# Patient Record
Sex: MALE | Race: WHITE | NOT HISPANIC OR LATINO | Employment: OTHER | URBAN - METROPOLITAN AREA
[De-identification: names, ages, dates, MRNs, and addresses within clinical notes are randomized per-mention and may not be internally consistent; named-entity substitution may affect disease eponyms.]

---

## 2023-02-03 PROBLEM — R53.83 LOW ENERGY: Status: ACTIVE | Noted: 2023-02-03

## 2023-02-03 PROBLEM — J30.9 ALLERGIC RHINITIS: Status: ACTIVE | Noted: 2023-02-03

## 2023-02-03 PROBLEM — H52.4 HYPEROPIA WITH ASTIGMATISM AND PRESBYOPIA: Status: ACTIVE | Noted: 2023-02-03

## 2023-02-03 PROBLEM — M79.673 FOOT PAIN: Status: ACTIVE | Noted: 2023-02-03

## 2023-02-03 PROBLEM — E55.9 VITAMIN D DEFICIENCY: Status: ACTIVE | Noted: 2023-02-03

## 2023-02-03 PROBLEM — N52.9 MALE ERECTILE DISORDER: Status: ACTIVE | Noted: 2023-02-03

## 2023-02-03 PROBLEM — H52.209 HYPEROPIA WITH ASTIGMATISM AND PRESBYOPIA: Status: ACTIVE | Noted: 2023-02-03

## 2023-02-03 PROBLEM — M54.50 LOWER BACK PAIN: Status: ACTIVE | Noted: 2023-02-03

## 2023-02-03 PROBLEM — M72.2 PLANTAR FASCIITIS: Status: ACTIVE | Noted: 2023-02-03

## 2023-02-03 PROBLEM — L73.9 FOLLICULITIS: Status: ACTIVE | Noted: 2023-02-03

## 2023-02-03 PROBLEM — I10 HYPERTENSION: Status: ACTIVE | Noted: 2023-02-03

## 2023-02-03 PROBLEM — N40.0 BPH (BENIGN PROSTATIC HYPERPLASIA): Status: ACTIVE | Noted: 2023-02-03

## 2023-02-03 PROBLEM — H52.00 HYPEROPIA WITH ASTIGMATISM AND PRESBYOPIA: Status: ACTIVE | Noted: 2023-02-03

## 2023-02-03 PROBLEM — G57.60 MORTON NEUROMA: Status: ACTIVE | Noted: 2023-02-03

## 2023-02-03 PROBLEM — R25.1 TREMOR OF RIGHT HAND: Status: ACTIVE | Noted: 2023-02-03

## 2023-02-03 PROBLEM — M47.812 CERVICAL SPONDYLOSIS: Status: ACTIVE | Noted: 2023-02-03

## 2023-02-03 PROBLEM — R51.9 HEADACHE: Status: ACTIVE | Noted: 2023-02-03

## 2023-02-03 PROBLEM — G25.0 ESSENTIAL TREMOR: Status: ACTIVE | Noted: 2023-02-03

## 2023-02-03 PROBLEM — H02.889 MGD (MEIBOMIAN GLAND DISEASE): Status: ACTIVE | Noted: 2023-02-03

## 2023-02-03 PROBLEM — E78.5 HYPERLIPIDEMIA: Status: ACTIVE | Noted: 2023-02-03

## 2023-02-03 RX ORDER — ATORVASTATIN CALCIUM 20 MG/1
1 TABLET, FILM COATED ORAL NIGHTLY
COMMUNITY
Start: 2022-05-16 | End: 2023-05-10 | Stop reason: SDUPTHER

## 2023-02-03 RX ORDER — LISINOPRIL 20 MG/1
1 TABLET ORAL DAILY
COMMUNITY
Start: 2021-03-01 | End: 2023-10-23 | Stop reason: SDUPTHER

## 2023-02-03 RX ORDER — TADALAFIL 5 MG/1
1 TABLET ORAL DAILY
COMMUNITY
Start: 2021-09-16 | End: 2024-05-31 | Stop reason: SDUPTHER

## 2023-02-03 RX ORDER — MUPIROCIN 20 MG/G
OINTMENT TOPICAL
COMMUNITY
Start: 2022-06-25 | End: 2023-06-14 | Stop reason: SDUPTHER

## 2023-02-03 RX ORDER — AZELASTINE 1 MG/ML
2 SPRAY, METERED NASAL 2 TIMES DAILY
COMMUNITY
Start: 2018-04-12

## 2023-02-03 RX ORDER — TAMSULOSIN HYDROCHLORIDE 0.4 MG/1
1 CAPSULE ORAL 2 TIMES DAILY
COMMUNITY
Start: 2016-10-31 | End: 2024-05-28 | Stop reason: SDUPTHER

## 2023-02-03 RX ORDER — GABAPENTIN 600 MG/1
1 TABLET ORAL DAILY
COMMUNITY
Start: 2021-09-01 | End: 2023-03-31 | Stop reason: SDUPTHER

## 2023-02-03 RX ORDER — PROPRANOLOL HYDROCHLORIDE 60 MG/1
60 CAPSULE, EXTENDED RELEASE ORAL DAILY
COMMUNITY
End: 2023-06-14 | Stop reason: DRUGHIGH

## 2023-02-03 RX ORDER — FLUTICASONE PROPIONATE 50 MCG
2 SPRAY, SUSPENSION (ML) NASAL DAILY
COMMUNITY
Start: 2018-04-12 | End: 2023-04-19 | Stop reason: SDUPTHER

## 2023-02-03 RX ORDER — MELOXICAM 15 MG/1
1 TABLET ORAL DAILY
COMMUNITY
Start: 2020-01-06

## 2023-03-09 ENCOUNTER — TELEPHONE (OUTPATIENT)
Dept: PRIMARY CARE | Facility: CLINIC | Age: 63
End: 2023-03-09
Payer: MEDICARE

## 2023-03-09 NOTE — TELEPHONE ENCOUNTER
Oskar Newton. When I called this patient to reschedule for you vacation he asked if you can call when you get back. He wanted you to go over his results from his imaging done on his back. Thanks. Seamus

## 2023-03-17 ENCOUNTER — APPOINTMENT (OUTPATIENT)
Dept: PRIMARY CARE | Facility: CLINIC | Age: 63
End: 2023-03-17
Payer: MEDICARE

## 2023-03-31 DIAGNOSIS — G57.63 MORTON'S NEUROMA OF BOTH FEET: Primary | ICD-10-CM

## 2023-03-31 RX ORDER — GABAPENTIN 600 MG/1
600 TABLET ORAL DAILY PRN
Qty: 30 TABLET | Refills: 3 | Status: SHIPPED | OUTPATIENT
Start: 2023-03-31 | End: 2023-08-28 | Stop reason: SDUPTHER

## 2023-03-31 RX ORDER — CICLOPIROX OLAMINE 7.7 MG/G
CREAM TOPICAL 2 TIMES DAILY
COMMUNITY
End: 2023-03-31 | Stop reason: SDUPTHER

## 2023-03-31 RX ORDER — CICLOPIROX OLAMINE 7.7 MG/G
CREAM TOPICAL 2 TIMES DAILY
Qty: 28 G | Refills: 2 | Status: SHIPPED | OUTPATIENT
Start: 2023-03-31

## 2023-04-19 DIAGNOSIS — J30.9 ALLERGIC RHINITIS, UNSPECIFIED SEASONALITY, UNSPECIFIED TRIGGER: Primary | ICD-10-CM

## 2023-04-19 RX ORDER — FLUTICASONE PROPIONATE 50 MCG
2 SPRAY, SUSPENSION (ML) NASAL DAILY
Qty: 16 G | Refills: 3 | Status: SHIPPED | OUTPATIENT
Start: 2023-04-19 | End: 2024-01-11 | Stop reason: SDUPTHER

## 2023-05-04 ENCOUNTER — PATIENT OUTREACH (OUTPATIENT)
Dept: CARE COORDINATION | Facility: CLINIC | Age: 63
End: 2023-05-04
Payer: MEDICARE

## 2023-05-04 NOTE — PROGRESS NOTES
[facility] member [facility] ED  outreach.    Introduced myself and purpose of call.  Confirmed : No  Complete [facility] assessment not completed. 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  Member expressed appreciation for outreach. States improvement.  Explained that being part of [facility] I am here to help close gaps in care as well and am happy to assist if needed in the future.  No assistance needed at this time.  MW

## 2023-05-10 DIAGNOSIS — E78.5 HYPERLIPIDEMIA, UNSPECIFIED HYPERLIPIDEMIA TYPE: Primary | ICD-10-CM

## 2023-05-10 RX ORDER — ATORVASTATIN CALCIUM 20 MG/1
20 TABLET, FILM COATED ORAL NIGHTLY
Qty: 90 TABLET | Refills: 3 | Status: SHIPPED | OUTPATIENT
Start: 2023-05-10 | End: 2024-02-29 | Stop reason: SDUPTHER

## 2023-06-14 ENCOUNTER — OFFICE VISIT (OUTPATIENT)
Dept: PRIMARY CARE | Facility: CLINIC | Age: 63
End: 2023-06-14
Payer: COMMERCIAL

## 2023-06-14 VITALS
BODY MASS INDEX: 31.23 KG/M2 | OXYGEN SATURATION: 97 % | TEMPERATURE: 97.8 F | HEIGHT: 71 IN | DIASTOLIC BLOOD PRESSURE: 84 MMHG | SYSTOLIC BLOOD PRESSURE: 150 MMHG | WEIGHT: 223.1 LBS | HEART RATE: 72 BPM

## 2023-06-14 DIAGNOSIS — I10 PRIMARY HYPERTENSION: ICD-10-CM

## 2023-06-14 DIAGNOSIS — M25.511 RIGHT SHOULDER PAIN, UNSPECIFIED CHRONICITY: ICD-10-CM

## 2023-06-14 DIAGNOSIS — Z12.83 SCREENING FOR MALIGNANT NEOPLASM OF SKIN: ICD-10-CM

## 2023-06-14 DIAGNOSIS — Z87.2: ICD-10-CM

## 2023-06-14 DIAGNOSIS — R25.1 TREMOR OF RIGHT HAND: Primary | ICD-10-CM

## 2023-06-14 PROCEDURE — 3079F DIAST BP 80-89 MM HG: CPT | Performed by: FAMILY MEDICINE

## 2023-06-14 PROCEDURE — 3077F SYST BP >= 140 MM HG: CPT | Performed by: FAMILY MEDICINE

## 2023-06-14 PROCEDURE — 99214 OFFICE O/P EST MOD 30 MIN: CPT | Performed by: FAMILY MEDICINE

## 2023-06-14 RX ORDER — MUPIROCIN 20 MG/G
OINTMENT TOPICAL 2 TIMES DAILY
Qty: 15 G | Refills: 3 | Status: SHIPPED | OUTPATIENT
Start: 2023-06-14

## 2023-06-14 RX ORDER — PROPRANOLOL HYDROCHLORIDE 80 MG/1
80 CAPSULE, EXTENDED RELEASE ORAL DAILY
Qty: 30 CAPSULE | Refills: 11 | Status: SHIPPED | OUTPATIENT
Start: 2023-06-14 | End: 2024-02-29 | Stop reason: SDUPTHER

## 2023-06-14 NOTE — PROGRESS NOTES
"Subjective   Patient ID: Scottie Butler is a 62 y.o. who presents for Follow-up.  HPI    HTN  - BP elevated today  - No headaches, chest pain, shortness of breath, vision changes, dizziness  - Compliant with medications    Tremor   - Overall slightly improved. Declines neurology evaluation    R arm pain   - Follows with sports medicine, concern for possible proximal biceps tendinopathy    R middle finger \"getting stuck\"  - Notices it popping occasionally  - No pain or limitations due to this    Skin concerns  - Worried about moles on back     Mupirocin refill- occasionally needs this for ingrown hairs. No current issues     Review of Systems  10 point review of systems negative except as noted in HPI.    Objective     /81 (BP Location: Left arm, Patient Position: Sitting)   Pulse 72   Temp 36.6 °C (97.8 °F) (Temporal)   Ht 1.803 m (5' 11\")   Wt 101 kg (223 lb 1.6 oz)   SpO2 97%   BMI 31.12 kg/m²   General: well appearing, no distress  Neck: No lymphadenopathy, no thyromegaly  CV: Regular rate and rhythm, no murmur  Lungs: Clear to auscultation bilaterally  Abdomen: Soft, nontender, nondistended  Extremities: No edema noted  Psych: Appropriate mood and affect   MSK: R middle finger with catching on extension, consistent with trigger finger  Neuro: No significant tremor on exam today  Skin: 3 Sks on back. Multiple nevi on chest and back    Assessment/Plan   63 yo M here for follow-up    HTN  - BP elevated today. Discussed options. He will check BP at work regularly and return in 1 month (or sooner if elevated BP at work)    R arm injury  - Continue management per sports med    R trigger finger  - Declines orthopedic referral, will monitor    Essential tremor  - Improving. 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 However he does additionally have many nevi scattered across body, and has history of tanning. Will refer to " dermatology for complete skin check    RTC 1 month

## 2023-08-04 DIAGNOSIS — J30.9 ALLERGIC RHINITIS, UNSPECIFIED SEASONALITY, UNSPECIFIED TRIGGER: ICD-10-CM

## 2023-08-24 ENCOUNTER — TELEPHONE (OUTPATIENT)
Dept: PRIMARY CARE | Facility: CLINIC | Age: 63
End: 2023-08-24
Payer: MEDICARE

## 2023-08-28 DIAGNOSIS — G57.63 MORTON'S NEUROMA OF BOTH FEET: ICD-10-CM

## 2023-08-28 RX ORDER — GABAPENTIN 600 MG/1
600 TABLET ORAL DAILY PRN
Qty: 30 TABLET | Refills: 3 | Status: SHIPPED | OUTPATIENT
Start: 2023-08-28 | End: 2023-11-09 | Stop reason: SDUPTHER

## 2023-10-23 DIAGNOSIS — I10 PRIMARY HYPERTENSION: Primary | ICD-10-CM

## 2023-10-23 RX ORDER — LISINOPRIL 20 MG/1
20 TABLET ORAL DAILY
Qty: 90 TABLET | Refills: 3 | Status: SHIPPED | OUTPATIENT
Start: 2023-10-23 | End: 2024-01-11 | Stop reason: SDUPTHER

## 2023-10-30 ENCOUNTER — OFFICE VISIT (OUTPATIENT)
Dept: PRIMARY CARE | Facility: CLINIC | Age: 63
End: 2023-10-30
Payer: MEDICARE

## 2023-10-30 VITALS
HEIGHT: 70 IN | RESPIRATION RATE: 18 BRPM | SYSTOLIC BLOOD PRESSURE: 127 MMHG | BODY MASS INDEX: 32.87 KG/M2 | HEART RATE: 87 BPM | OXYGEN SATURATION: 99 % | TEMPERATURE: 97.9 F | WEIGHT: 229.6 LBS | DIASTOLIC BLOOD PRESSURE: 83 MMHG

## 2023-10-30 DIAGNOSIS — I10 PRIMARY HYPERTENSION: Primary | ICD-10-CM

## 2023-10-30 DIAGNOSIS — L98.9 SKIN LESION: ICD-10-CM

## 2023-10-30 DIAGNOSIS — E78.5 HYPERLIPIDEMIA, UNSPECIFIED HYPERLIPIDEMIA TYPE: ICD-10-CM

## 2023-10-30 DIAGNOSIS — E55.9 VITAMIN D DEFICIENCY: ICD-10-CM

## 2023-10-30 LAB
25(OH)D3 SERPL-MCNC: 33 NG/ML (ref 30–100)
ANION GAP SERPL CALC-SCNC: 13 MMOL/L (ref 10–20)
BUN SERPL-MCNC: 16 MG/DL (ref 6–23)
CALCIUM SERPL-MCNC: 9.7 MG/DL (ref 8.6–10.6)
CHLORIDE SERPL-SCNC: 106 MMOL/L (ref 98–107)
CO2 SERPL-SCNC: 26 MMOL/L (ref 21–32)
CREAT SERPL-MCNC: 1.03 MG/DL (ref 0.5–1.3)
ERYTHROCYTE [DISTWIDTH] IN BLOOD BY AUTOMATED COUNT: 14.5 % (ref 11.5–14.5)
EST. AVERAGE GLUCOSE BLD GHB EST-MCNC: 108 MG/DL
GFR SERPL CREATININE-BSD FRML MDRD: 81 ML/MIN/1.73M*2
GLUCOSE SERPL-MCNC: 92 MG/DL (ref 74–99)
HBA1C MFR BLD: 5.4 %
HCT VFR BLD AUTO: 46.7 % (ref 41–52)
HGB BLD-MCNC: 15.1 G/DL (ref 13.5–17.5)
MCH RBC QN AUTO: 28.8 PG (ref 26–34)
MCHC RBC AUTO-ENTMCNC: 32.3 G/DL (ref 32–36)
MCV RBC AUTO: 89 FL (ref 80–100)
NRBC BLD-RTO: 0 /100 WBCS (ref 0–0)
PLATELET # BLD AUTO: 297 X10*3/UL (ref 150–450)
PMV BLD AUTO: 10.9 FL (ref 7.5–11.5)
POTASSIUM SERPL-SCNC: 4.3 MMOL/L (ref 3.5–5.3)
RBC # BLD AUTO: 5.25 X10*6/UL (ref 4.5–5.9)
SODIUM SERPL-SCNC: 141 MMOL/L (ref 136–145)
TSH SERPL-ACNC: 3 MIU/L (ref 0.44–3.98)
WBC # BLD AUTO: 6.9 X10*3/UL (ref 4.4–11.3)

## 2023-10-30 PROCEDURE — 36415 COLL VENOUS BLD VENIPUNCTURE: CPT | Performed by: NURSE PRACTITIONER

## 2023-10-30 PROCEDURE — 1126F AMNT PAIN NOTED NONE PRSNT: CPT | Performed by: NURSE PRACTITIONER

## 2023-10-30 PROCEDURE — 99214 OFFICE O/P EST MOD 30 MIN: CPT | Performed by: NURSE PRACTITIONER

## 2023-10-30 PROCEDURE — 3079F DIAST BP 80-89 MM HG: CPT | Performed by: NURSE PRACTITIONER

## 2023-10-30 PROCEDURE — 83036 HEMOGLOBIN GLYCOSYLATED A1C: CPT | Performed by: NURSE PRACTITIONER

## 2023-10-30 PROCEDURE — 82306 VITAMIN D 25 HYDROXY: CPT | Performed by: NURSE PRACTITIONER

## 2023-10-30 PROCEDURE — 80048 BASIC METABOLIC PNL TOTAL CA: CPT | Performed by: NURSE PRACTITIONER

## 2023-10-30 PROCEDURE — 84443 ASSAY THYROID STIM HORMONE: CPT | Performed by: NURSE PRACTITIONER

## 2023-10-30 PROCEDURE — 3074F SYST BP LT 130 MM HG: CPT | Performed by: NURSE PRACTITIONER

## 2023-10-30 PROCEDURE — 1159F MED LIST DOCD IN RCRD: CPT | Performed by: NURSE PRACTITIONER

## 2023-10-30 PROCEDURE — 85027 COMPLETE CBC AUTOMATED: CPT | Performed by: NURSE PRACTITIONER

## 2023-10-30 ASSESSMENT — PAIN SCALES - GENERAL: PAINLEVEL: 0-NO PAIN

## 2023-10-30 NOTE — PROGRESS NOTES
"Subjective   Scottie Butler is a 63 y.o. male who presents for Follow-up.  HPI    Mr. Butler is a 64 yo M here today for follow up    PCP: Dr Newton    Notes ongoing derm concern on his back/ shoulder. Has not yet scheduled with dermatology. Notes history of folliculitis and wanted to be sure the area was not infected. Denies itching, drainage, or discomfort, just notes a lesion on his back.     Hx of folliculitis for which he uses mupirocin.      Essential tremor  Increased dose of propranolol at last visit but patient reports confusion with dose. Seems as if he is still taking the lower dose. Tremor is worsened the day after consuming alcohol.     Gabapentin  Currently taking 600mg once per day. He states that his pain control is inadequate. He will sometimes pair medication with motrin, more rarely with tylenol. States that he does not have pain when he is more mobile/ exercising. Pain has been worse since senior care. He also notes weight gain since senior care. He has plans to return to the gym next week. Would consider PT.     HTN  Well controlled   Due for labs  Not fasting today.    All systems reviewed. Review of systems negative except for noted positives in HPI    Objective     /83   Pulse 87   Temp 36.6 °C (97.9 °F)   Resp 18   Ht 1.778 m (5' 10\")   Wt 104 kg (229 lb 9.6 oz)   SpO2 99%   BMI 32.94 kg/m²    Vital signs noted and reviewed.       Physical Exam  Constitutional:       Appearance: Normal appearance.   Cardiovascular:      Rate and Rhythm: Normal rate and regular rhythm.   Pulmonary:      Effort: Pulmonary effort is normal. No respiratory distress.      Breath sounds: Normal breath sounds.   Skin:     General: Skin is warm and dry.   Neurological:      Mental Status: He is oriented to person, place, and time.   Psychiatric:         Mood and Affect: Mood normal.             Assessment/Plan   Problem List Items Addressed This Visit          Cardiac and Vasculature    Hyperlipidemia    " Hypertension - Primary    Relevant Orders    Basic Metabolic Panel    TSH with reflex to Free T4 if abnormal    CBC    Hemoglobin A1C    Referral to Ophthalmology       Endocrine/Metabolic    Vitamin D deficiency    Relevant Orders    Vitamin D 25-Hydroxy,Total (for eval of Vitamin D levels)     Other Visit Diagnoses       Skin lesion        Relevant Orders    Referral to Dermatology

## 2023-10-30 NOTE — PATIENT INSTRUCTIONS
Thank you for coming in for your visit today!    Please follow up in 6 months with Dr. Newton.    Transition back into the gym, as discussed.  Consider consult with physical therapy.     Start acetaminophen daily, in addition to gabapentin.   Stretch daily.     Today we completed blood work. We will contact you with any abnormalities from this testing.    Call to schedule with ophthalmology and dermatology. Consider calling [facility] or [facility].     Call 911 or go to the emergency room if you have pain in your chest, difficulty breathing, or other life threatening symptoms.

## 2023-11-09 DIAGNOSIS — G57.63 MORTON'S NEUROMA OF BOTH FEET: ICD-10-CM

## 2023-11-09 RX ORDER — GABAPENTIN 600 MG/1
600 TABLET ORAL DAILY PRN
Qty: 30 TABLET | Refills: 3 | Status: SHIPPED | OUTPATIENT
Start: 2023-11-09 | End: 2024-01-11 | Stop reason: SDUPTHER

## 2023-11-29 ENCOUNTER — OFFICE VISIT (OUTPATIENT)
Dept: PRIMARY CARE | Facility: CLINIC | Age: 63
End: 2023-11-29
Payer: MEDICARE

## 2023-11-29 VITALS
HEIGHT: 71 IN | SYSTOLIC BLOOD PRESSURE: 124 MMHG | BODY MASS INDEX: 31.78 KG/M2 | OXYGEN SATURATION: 98 % | HEART RATE: 81 BPM | WEIGHT: 227 LBS | RESPIRATION RATE: 16 BRPM | TEMPERATURE: 97.2 F | DIASTOLIC BLOOD PRESSURE: 75 MMHG

## 2023-11-29 DIAGNOSIS — H65.191 ACUTE MUCOID OTITIS MEDIA OF RIGHT EAR: Primary | ICD-10-CM

## 2023-11-29 DIAGNOSIS — R05.8 POST-VIRAL COUGH SYNDROME: ICD-10-CM

## 2023-11-29 DIAGNOSIS — M25.511 RIGHT SHOULDER PAIN, UNSPECIFIED CHRONICITY: ICD-10-CM

## 2023-11-29 DIAGNOSIS — R09.81 SINUS CONGESTION: ICD-10-CM

## 2023-11-29 PROCEDURE — 3078F DIAST BP <80 MM HG: CPT | Performed by: NURSE PRACTITIONER

## 2023-11-29 PROCEDURE — 1159F MED LIST DOCD IN RCRD: CPT | Performed by: NURSE PRACTITIONER

## 2023-11-29 PROCEDURE — 1126F AMNT PAIN NOTED NONE PRSNT: CPT | Performed by: NURSE PRACTITIONER

## 2023-11-29 PROCEDURE — 3074F SYST BP LT 130 MM HG: CPT | Performed by: NURSE PRACTITIONER

## 2023-11-29 PROCEDURE — 99214 OFFICE O/P EST MOD 30 MIN: CPT | Performed by: NURSE PRACTITIONER

## 2023-11-29 RX ORDER — ALBUTEROL SULFATE 90 UG/1
2 AEROSOL, METERED RESPIRATORY (INHALATION) EVERY 4 HOURS PRN
Qty: 8 G | Refills: 5 | Status: SHIPPED | OUTPATIENT
Start: 2023-11-29 | End: 2024-11-28

## 2023-11-29 RX ORDER — AMOXICILLIN AND CLAVULANATE POTASSIUM 875; 125 MG/1; MG/1
875 TABLET, FILM COATED ORAL 2 TIMES DAILY
Qty: 20 TABLET | Refills: 0 | Status: SHIPPED | OUTPATIENT
Start: 2023-11-29 | End: 2023-12-09

## 2023-11-29 RX ORDER — CETIRIZINE HYDROCHLORIDE, PSEUDOEPHEDRINE HYDROCHLORIDE 5; 120 MG/1; MG/1
1 TABLET, FILM COATED, EXTENDED RELEASE ORAL 2 TIMES DAILY
Qty: 60 TABLET | Refills: 11 | Status: SHIPPED | OUTPATIENT
Start: 2023-11-29 | End: 2024-11-28

## 2023-11-29 RX ORDER — BENZONATATE 100 MG/1
100 CAPSULE ORAL 3 TIMES DAILY PRN
Qty: 42 CAPSULE | Refills: 0 | Status: SHIPPED | OUTPATIENT
Start: 2023-11-29 | End: 2023-12-29

## 2023-11-29 ASSESSMENT — PATIENT HEALTH QUESTIONNAIRE - PHQ9
SUM OF ALL RESPONSES TO PHQ9 QUESTIONS 1 & 2: 0
2. FEELING DOWN, DEPRESSED OR HOPELESS: NOT AT ALL
1. LITTLE INTEREST OR PLEASURE IN DOING THINGS: NOT AT ALL

## 2023-11-29 ASSESSMENT — PAIN SCALES - GENERAL: PAINLEVEL: 0-NO PAIN

## 2023-11-29 NOTE — PROGRESS NOTES
"Subjective   Scottie Butler is a 63 y.o. male who presents for Generalized Body Aches.  HPI    Mr. Butler notes that he has been feeling sick since last week. He was with family on Thanksgiving and a child was ill. Since that time many of the attendees have also become sick with similar symptoms. He feels he is beginning to feel some improvement today. He has been utilizing Nyquil/ Dayquil and flonase. He notes that the cough is worse at nighttime. He has been experiencing ear pain and discomfort, in addition to generalized myalgias. Denies shortness of breath.     Prior to this acute illness he had returned to the gym. He reports continued pain, only in his shoulders while exercising. He would be interested in pursing physical therapy at this time.   All systems reviewed. Review of systems negative except for noted positives in HPI    Objective     /75   Pulse 81   Temp 36.2 °C (97.2 °F)   Resp 16   Ht 1.803 m (5' 11\")   Wt 103 kg (227 lb)   SpO2 98%   BMI 31.66 kg/m²    Vital signs noted and reviewed.       Physical Exam  Constitutional:       Appearance: Normal appearance.   HENT:      Ears:      Comments: R AOM  Cardiovascular:      Rate and Rhythm: Normal rate and regular rhythm.   Pulmonary:      Effort: Pulmonary effort is normal. No respiratory distress.      Breath sounds: Normal breath sounds.   Skin:     General: Skin is warm and dry.   Neurological:      Mental Status: He is oriented to person, place, and time.   Psychiatric:         Mood and Affect: Mood normal.             Assessment/Plan   Problem List Items Addressed This Visit    None  Visit Diagnoses       Acute mucoid otitis media of right ear    -  Primary    Relevant Medications    amoxicillin-pot clavulanate (Augmentin) 875-125 mg tablet    Post-viral cough syndrome        Relevant Medications    benzonatate (Tessalon) 100 mg capsule    albuterol (Ventolin HFA) 90 mcg/actuation inhaler    Sinus congestion        Relevant " Medications    benzonatate (Tessalon) 100 mg capsule    cetirizine-pseudoephedrine (ZyrTEC-D) 5-120 mg 12 hr tablet    Right shoulder pain, unspecified chronicity        Relevant Orders    Referral to Physical Therapy

## 2023-11-29 NOTE — PATIENT INSTRUCTIONS
Thank you for coming in for your visit today!    Please follow up in 5 months for routine care.     Drink lots of water while taking the antibiotic.  Eat yogurt with active cultures or take a probiotic.   Take the medication with food.  Take ALL of this medication even if you are feeling better.     Call to schedule with physical therapy.     You may take benzonatate to suppress your cough.  Use the inhaler for a cyclic cough or for wheezing.   Place a humidifier next to your bed. Be sure to change the water daily.  Take pseudoephedrine as needed for for nasal congestion.           Call 911 or go to the emergency room if you have pain in your chest, difficulty breathing, or other life threatening symptoms.

## 2024-01-06 ENCOUNTER — APPOINTMENT (OUTPATIENT)
Dept: RADIOLOGY | Facility: HOSPITAL | Age: 64
End: 2024-01-06
Payer: MEDICARE

## 2024-01-06 ENCOUNTER — HOSPITAL ENCOUNTER (EMERGENCY)
Facility: HOSPITAL | Age: 64
Discharge: HOME | End: 2024-01-06
Attending: EMERGENCY MEDICINE
Payer: MEDICARE

## 2024-01-06 VITALS
BODY MASS INDEX: 27.3 KG/M2 | SYSTOLIC BLOOD PRESSURE: 162 MMHG | RESPIRATION RATE: 18 BRPM | WEIGHT: 195 LBS | HEIGHT: 71 IN | OXYGEN SATURATION: 94 % | TEMPERATURE: 96.4 F | DIASTOLIC BLOOD PRESSURE: 142 MMHG | HEART RATE: 74 BPM

## 2024-01-06 DIAGNOSIS — S09.90XA HEAD INJURY, INITIAL ENCOUNTER: ICD-10-CM
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
## 2024-01-06 DIAGNOSIS — F10.920 ALCOHOLIC INTOXICATION WITHOUT COMPLICATION (CMS-HCC): ICD-10-CM

## 2024-01-06 PROCEDURE — 99285 EMERGENCY DEPT VISIT HI MDM: CPT | Mod: 25 | Performed by: EMERGENCY MEDICINE

## 2024-01-06 PROCEDURE — 70450 CT HEAD/BRAIN W/O DYE: CPT

## 2024-01-06 PROCEDURE — 72125 CT NECK SPINE W/O DYE: CPT | Performed by: STUDENT IN AN ORGANIZED HEALTH CARE EDUCATION/TRAINING PROGRAM

## 2024-01-06 PROCEDURE — 70450 CT HEAD/BRAIN W/O DYE: CPT | Performed by: STUDENT IN AN ORGANIZED HEALTH CARE EDUCATION/TRAINING PROGRAM

## 2024-01-06 PROCEDURE — 99284 EMERGENCY DEPT VISIT MOD MDM: CPT | Performed by: EMERGENCY MEDICINE

## 2024-01-06 PROCEDURE — 72125 CT NECK SPINE W/O DYE: CPT

## 2024-01-06 SDOH — HEALTH STABILITY: MENTAL HEALTH: BEHAVIORS/MOOD: AGITATED

## 2024-01-06 ASSESSMENT — PAIN SCALES - GENERAL: PAINLEVEL_OUTOF10: 0 - NO PAIN

## 2024-01-06 ASSESSMENT — PAIN - FUNCTIONAL ASSESSMENT: PAIN_FUNCTIONAL_ASSESSMENT: 0-10

## 2024-01-06 ASSESSMENT — COLUMBIA-SUICIDE SEVERITY RATING SCALE - C-SSRS
2. HAVE YOU ACTUALLY HAD ANY THOUGHTS OF KILLING YOURSELF?: NO
1. IN THE PAST MONTH, HAVE YOU WISHED YOU WERE DEAD OR WISHED YOU COULD GO TO SLEEP AND NOT WAKE UP?: NO
6. HAVE YOU EVER DONE ANYTHING, STARTED TO DO ANYTHING, OR PREPARED TO DO ANYTHING TO END YOUR LIFE?: NO

## 2024-01-06 NOTE — ED PROVIDER NOTES
"EMERGENCY DEPARTMENT ENCOUNTER      Pt Name: Scottie Butler  MRN: [id number]  Birthdate 1960  Date of evaluation: 1/6/2024  Provider: Guero Asher MD    CHIEF COMPLAINT       Chief Complaint   Patient presents with    Fall     Fell down 4 stairs; wife states he had LOC and was \"gurgeling\" per EMS report;; pt upon ER arrival is confused, euphoric in nature, states he has been drinking but denies any other ingestion of substances; unsure if he hit his head.         HISTORY OF PRESENT ILLNESS    HPI  Patient is a 63-year-old male presenting after a fall.  Patient was reportedly heavily drinking.  His wife witnessed the fall.  He attempted to go down the steps, falling down 4 of them after tripping, striking his head on the concrete floor.  There is loss of conscious for approximately 2 minutes.  Patient was reportedly gurgling but did not vomit.  At presentation, patient denies headache, neck pain, nausea, vomiting, change in vision, chest pain, shortness of breath.  He is notably been aggressive and combative with staff.    Nursing Notes were reviewed.    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    Other specified disorders of ear, unspecified ear 05/28/2014    Crackling sound in ear    Personal history of other diseases of male genital organs 10/12/2018    History of benign prostatic hyperplasia    Personal history of other diseases of male genital organs 10/12/2018    History of benign prostatic hyperplasia    Personal history of other diseases of male genital organs 10/12/2018    History of benign prostatic hyperplasia    Urgency of urination 10/31/2016    Urinary urgency         SURGICAL HISTORY       Past Surgical History:   Procedure Laterality Date    MR HEAD " ANGIO WO IV CONTRAST  1/7/2019    MR HEAD ANGIO WO IV CONTRAST 1/7/2019 [facility] [place] [facility]    MR NECK ANGIO WO IV CONTRAST  1/7/2019    MR NECK ANGIO WO IV CONTRAST 1/7/2019 [facility] [place] [facility]         CURRENT MEDICATIONS       Previous Medications    ALBUTEROL (VENTOLIN HFA) 90 MCG/ACTUATION INHALER    Inhale 2 puffs every 4 hours if needed for wheezing or shortness of breath.    ATORVASTATIN (LIPITOR) 20 MG TABLET    Take 1 tablet (20 mg) by mouth once daily at bedtime.    AZELASTINE (ASTELIN) 137 MCG (0.1 %) NASAL SPRAY    Administer 2 sprays into affected nostril(s) twice a day.    CETIRIZINE-PSEUDOEPHEDRINE (ZYRTEC-D) 5-120 MG 12 HR TABLET    Take 1 tablet by mouth 2 times a day.    CICLOPIROX (LOPROX) 0.77 % CREAM    Apply topically 2 times a day.    FLUTICASONE (FLONASE) 50 MCG/ACTUATION NASAL SPRAY    Administer 2 sprays into each nostril once daily.    GABAPENTIN (NEURONTIN) 600 MG TABLET    Take 1 tablet (600 mg) by mouth once daily as needed (pain).    LISINOPRIL 20 MG TABLET    Take 1 tablet (20 mg) by mouth once daily.    MELOXICAM (MOBIC) 15 MG TABLET    Take 1 tablet (15 mg) by mouth once daily. With food    MUPIROCIN (BACTROBAN) 2 % OINTMENT    Apply topically 2 times a day. to affected area about 2-3 times a day.    PROPRANOLOL LA (INDERAL LA) 80 MG 24 HR CAPSULE    Take 1 capsule (80 mg) by mouth once daily. Do not crush, chew, or split.    TADALAFIL (CIALIS) 5 MG TABLET    Take 1 tablet (5 mg) by mouth once daily.    TAMSULOSIN (FLOMAX) 0.4 MG 24 HR CAPSULE    Take 1 capsule (0.4 mg) by mouth 2 times a day.       ALLERGIES     Patient has no known allergies.    FAMILY HISTORY       Family History   Problem Relation Name Age of Onset    No Known Problems Mother      No Known Problems Father            SOCIAL HISTORY       Social History     Socioeconomic History    Marital status: Single     Spouse name: None    Number of children: None    Years of education: None    Highest education level:  None   Occupational History    None   Tobacco Use    Smoking status: Every Day     Packs/day: 1     Types: Cigarettes    Smokeless tobacco: Never   Vaping Use    Vaping Use: Never used   Substance and Sexual Activity    Alcohol use: Yes     Alcohol/week: 4.0 standard drinks of alcohol     Types: 4 Cans of beer per week     Comment: states he drank a 12 pack of beer today    Drug use: Never    Sexual activity: None   Other Topics Concern    None   Social History Narrative    None     Social Determinants of Health     Financial Resource Strain: Not on file   Food Insecurity: Not on file   Transportation Needs: Not on file   Physical Activity: Not on file   Stress: Not on file   Social Connections: Not on file   Intimate Partner Violence: Not on file   Housing Stability: Not on file       SCREENINGS                        PHYSICAL EXAM    (up to 7 for level 4, 8 or more for level 5)     ED Triage Vitals [01/06/24 1639]   Temp Heart Rate Resp BP   35.8 °C (96.4 °F) 74 18 (!) 162/142      SpO2 Temp Source Heart Rate Source Patient Position   94 % Temporal -- --      BP Location FiO2 (%)     -- --       Physical Exam  Vitals and nursing note reviewed.   Constitutional:       General: He is not in acute distress.     Appearance: He is not toxic-appearing.   HENT:      Head: Normocephalic and atraumatic.      Nose: Nose normal. No congestion or rhinorrhea.      Mouth/Throat:      Mouth: Mucous membranes are dry.      Pharynx: Oropharynx is clear.   Eyes:      General:         Right eye: No discharge.      Conjunctiva/sclera: Conjunctivae normal.      Pupils: Pupils are equal, round, and reactive to light.   Neck:      Comments: In cervical collar.  No pain to palpation of the cervical spine.  Cardiovascular:      Rate and Rhythm: Normal rate and regular rhythm.      Heart sounds: Normal heart sounds.   Pulmonary:      Effort: Pulmonary effort is normal. No respiratory distress.      Breath sounds: Normal breath sounds.    Abdominal:      General: There is no distension.      Palpations: Abdomen is soft.      Tenderness: There is no abdominal tenderness.   Musculoskeletal:         General: No swelling, deformity or signs of injury. Normal range of motion.   Skin:     General: Skin is warm and dry.   Neurological:      Comments: Intoxicated.  Alert and oriented to person only.   Psychiatric:      Comments: Intoxicated.  Verbally aggressive with staff.  Making sexually inappropriate comments towards nursing staff.          DIAGNOSTIC RESULTS     LABS:  Labs Reviewed - No data to display    All other labs were within normal range or not returned as of this dictation.    Imaging  CT head wo IV contrast   Final Result   No acute intracranial abnormality.   Moderate polypoid mucosal thickening of all of the bilateral   paranasal sinuses. Clinical correlation for sinusitis is recommended.   No evidence of cervical spine fracture. Multilevel degenerative uncal   facet arthropathy, as described above.        Signed by: Randy Garcia 1/6/2024 6:54 PM   Dictation workstation:   [id number]      CT cervical spine wo IV contrast   Final Result   No acute intracranial abnormality.   Moderate polypoid mucosal thickening of all of the bilateral   paranasal sinuses. Clinical correlation for sinusitis is recommended.   No evidence of cervical spine fracture. Multilevel degenerative uncal   facet arthropathy, as described above.        Signed by: Randy Garcia 1/6/2024 6:54 PM   Dictation workstation:   [id number]           Procedures  Procedures     EMERGENCY DEPARTMENT COURSE/MDM:     Diagnoses as of 01/07/24 2150   Fall, initial encounter   Head injury, initial encounter   Alcoholic intoxication without complication (CMS/[facility])        Medical Decision Making  History obtained from patient, wife, EMS.  Records including labs, imaging, notes reviewed.  Given that the fall was witnessed and appear to be purely mechanical, labs were deferred.   Throughout his stay in the emergency department, patient became aggressive and noncooperative with staff on numerous occasions.  Security had to be called multiple times.  Both the resident and attending physicians had prolonged conversations with him and he was informed that he would be medicated if the behavior continue.  Eventually, he broke down and discussed the recent terminal diagnosis of cancer with his son.  This is reportedly the cause of his binge drinking.  This was confirmed by his sober wife.  He became more cooperative after this discussion.  He was sent to CT for scans of the head and cervical spine which returned negative.  Cervical collar was removed and patient was discharged home in satisfactory condition.  While still clinically intoxicated, he had a sober ride from his wife.  All questions answered and return precautions discussed.    Patient and or family in agreement and understanding of treatment plan.  All questions answered.      I reviewed the case with the attending ED physician. The attending ED physician agrees with the plan. Patient and/or patient´s representative was counseled regarding labs, imaging, likely diagnosis, and plan. All questions were answered.    ED Medications administered this visit:  Medications - No data to display    New Prescriptions from this visit:    New Prescriptions    No medications on file       Follow-up:  Anay Newton MD  [id number] [address] [facility] & [place] [id number] [phone]    Call   If symptoms worsen        Final Impression:   1. Fall, initial encounter    2. Head injury, initial encounter    3. Alcoholic intoxication without complication (CMS/HCC)          (Please note that portions of this note were completed with a voice recognition program.  Efforts were made to edit the dictations but occasionally words are mis-transcribed.)     Guero Asher MD  Resident  01/07/24 [id number]

## 2024-01-07 NOTE — DISCHARGE INSTRUCTIONS
Scottie was evaluated after a fall.  CT scans of his head and cervical spine were negative for fracture or bleeding.  Please return to the emergency department if he develops vomiting, visual changes, or severe headache.

## 2024-01-11 DIAGNOSIS — J30.9 ALLERGIC RHINITIS, UNSPECIFIED SEASONALITY, UNSPECIFIED TRIGGER: ICD-10-CM

## 2024-01-11 DIAGNOSIS — I10 PRIMARY HYPERTENSION: ICD-10-CM

## 2024-01-11 DIAGNOSIS — G57.63 MORTON'S NEUROMA OF BOTH FEET: ICD-10-CM

## 2024-01-11 RX ORDER — GABAPENTIN 600 MG/1
600 TABLET ORAL DAILY PRN
Qty: 30 TABLET | Refills: 3 | Status: SHIPPED | OUTPATIENT
Start: 2024-01-11 | End: 2024-02-29 | Stop reason: SDUPTHER

## 2024-01-11 RX ORDER — LISINOPRIL 20 MG/1
20 TABLET ORAL DAILY
Qty: 90 TABLET | Refills: 3 | Status: SHIPPED | OUTPATIENT
Start: 2024-01-11 | End: 2024-01-17 | Stop reason: SDUPTHER

## 2024-01-11 RX ORDER — FLUTICASONE PROPIONATE 50 MCG
2 SPRAY, SUSPENSION (ML) NASAL DAILY
Qty: 16 G | Refills: 3 | Status: SHIPPED | OUTPATIENT
Start: 2024-01-11 | End: 2024-04-30 | Stop reason: SDUPTHER

## 2024-01-17 DIAGNOSIS — I10 PRIMARY HYPERTENSION: ICD-10-CM

## 2024-01-17 RX ORDER — LISINOPRIL 20 MG/1
20 TABLET ORAL DAILY
Qty: 90 TABLET | Refills: 3 | Status: SHIPPED | OUTPATIENT
Start: 2024-01-17

## 2024-01-31 DIAGNOSIS — R05.8 OTHER COUGH: Primary | ICD-10-CM

## 2024-01-31 RX ORDER — BENZONATATE 100 MG/1
100 CAPSULE ORAL 3 TIMES DAILY PRN
Qty: 42 CAPSULE | Refills: 0 | Status: SHIPPED | OUTPATIENT
Start: 2024-01-31 | End: 2024-03-01

## 2024-02-28 ENCOUNTER — TELEPHONE (OUTPATIENT)
Dept: PRIMARY CARE | Facility: CLINIC | Age: 64
End: 2024-02-28
Payer: MEDICARE

## 2024-02-28 NOTE — TELEPHONE ENCOUNTER
Pt called requesting refill of tamsulosin and atorvastatin. Noted pt not seen in this clinic. Call placed to pt to make aware. Pt confirms that he has the correct contact number and will attempt again in the morning.

## 2024-02-29 DIAGNOSIS — G57.63 MORTON'S NEUROMA OF BOTH FEET: ICD-10-CM

## 2024-02-29 DIAGNOSIS — R25.1 TREMOR OF RIGHT HAND: ICD-10-CM

## 2024-02-29 DIAGNOSIS — E78.5 HYPERLIPIDEMIA, UNSPECIFIED HYPERLIPIDEMIA TYPE: ICD-10-CM

## 2024-02-29 RX ORDER — GABAPENTIN 600 MG/1
600 TABLET ORAL DAILY PRN
Qty: 30 TABLET | Refills: 3 | Status: SHIPPED | OUTPATIENT
Start: 2024-02-29 | End: 2024-04-30 | Stop reason: SDUPTHER

## 2024-02-29 RX ORDER — PROPRANOLOL HYDROCHLORIDE 80 MG/1
80 CAPSULE, EXTENDED RELEASE ORAL DAILY
Qty: 30 CAPSULE | Refills: 11 | Status: SHIPPED | OUTPATIENT
Start: 2024-02-29 | End: 2025-02-28

## 2024-02-29 RX ORDER — ATORVASTATIN CALCIUM 20 MG/1
20 TABLET, FILM COATED ORAL NIGHTLY
Qty: 90 TABLET | Refills: 3 | Status: SHIPPED | OUTPATIENT
Start: 2024-02-29

## 2024-04-30 ENCOUNTER — OFFICE VISIT (OUTPATIENT)
Dept: PRIMARY CARE | Facility: CLINIC | Age: 64
End: 2024-04-30
Payer: MEDICARE

## 2024-04-30 VITALS
HEART RATE: 75 BPM | DIASTOLIC BLOOD PRESSURE: 79 MMHG | WEIGHT: 233.5 LBS | TEMPERATURE: 97.8 F | BODY MASS INDEX: 32.69 KG/M2 | SYSTOLIC BLOOD PRESSURE: 122 MMHG | HEIGHT: 71 IN

## 2024-04-30 DIAGNOSIS — N40.1 BENIGN PROSTATIC HYPERPLASIA WITH LOWER URINARY TRACT SYMPTOMS, SYMPTOM DETAILS UNSPECIFIED: ICD-10-CM

## 2024-04-30 DIAGNOSIS — J30.9 ALLERGIC RHINITIS, UNSPECIFIED SEASONALITY, UNSPECIFIED TRIGGER: ICD-10-CM

## 2024-04-30 DIAGNOSIS — E55.9 VITAMIN D DEFICIENCY: ICD-10-CM

## 2024-04-30 DIAGNOSIS — R39.15 URINARY URGENCY: Primary | ICD-10-CM

## 2024-04-30 DIAGNOSIS — R25.1 TREMOR OF RIGHT HAND: ICD-10-CM

## 2024-04-30 DIAGNOSIS — R73.9 HYPERGLYCEMIA: ICD-10-CM

## 2024-04-30 DIAGNOSIS — G57.63 MORTON'S NEUROMA OF BOTH FEET: ICD-10-CM

## 2024-04-30 LAB
25(OH)D3 SERPL-MCNC: 46 NG/ML (ref 30–100)
EST. AVERAGE GLUCOSE BLD GHB EST-MCNC: 111 MG/DL
HBA1C MFR BLD: 5.5 %
POC APPEARANCE, URINE: CLEAR
POC BILIRUBIN, URINE: NEGATIVE
POC BLOOD, URINE: ABNORMAL
POC COLOR, URINE: YELLOW
POC GLUCOSE, URINE: NEGATIVE MG/DL
POC KETONES, URINE: NEGATIVE MG/DL
POC LEUKOCYTES, URINE: ABNORMAL
POC NITRITE,URINE: NEGATIVE
POC PH, URINE: 5.5 PH
POC PROTEIN, URINE: NEGATIVE MG/DL
POC SPECIFIC GRAVITY, URINE: 1.01
POC UROBILINOGEN, URINE: 0.2 EU/DL

## 2024-04-30 PROCEDURE — 81002 URINALYSIS NONAUTO W/O SCOPE: CPT | Mod: 91 | Performed by: NURSE PRACTITIONER

## 2024-04-30 PROCEDURE — 87086 URINE CULTURE/COLONY COUNT: CPT | Performed by: NURSE PRACTITIONER

## 2024-04-30 PROCEDURE — 80048 BASIC METABOLIC PNL TOTAL CA: CPT | Performed by: NURSE PRACTITIONER

## 2024-04-30 PROCEDURE — 36415 COLL VENOUS BLD VENIPUNCTURE: CPT | Performed by: NURSE PRACTITIONER

## 2024-04-30 PROCEDURE — 3074F SYST BP LT 130 MM HG: CPT | Performed by: NURSE PRACTITIONER

## 2024-04-30 PROCEDURE — 99215 OFFICE O/P EST HI 40 MIN: CPT | Performed by: NURSE PRACTITIONER

## 2024-04-30 PROCEDURE — 83036 HEMOGLOBIN GLYCOSYLATED A1C: CPT | Performed by: NURSE PRACTITIONER

## 2024-04-30 PROCEDURE — 82306 VITAMIN D 25 HYDROXY: CPT | Performed by: NURSE PRACTITIONER

## 2024-04-30 PROCEDURE — 3078F DIAST BP <80 MM HG: CPT | Performed by: NURSE PRACTITIONER

## 2024-04-30 PROCEDURE — 1159F MED LIST DOCD IN RCRD: CPT | Performed by: NURSE PRACTITIONER

## 2024-04-30 RX ORDER — GABAPENTIN 600 MG/1
600 TABLET ORAL 2 TIMES DAILY
Qty: 60 TABLET | Refills: 6 | Status: SHIPPED | OUTPATIENT
Start: 2024-04-30

## 2024-04-30 RX ORDER — FLUTICASONE PROPIONATE 50 MCG
2 SPRAY, SUSPENSION (ML) NASAL DAILY
Qty: 16 G | Refills: 3 | Status: SHIPPED | OUTPATIENT
Start: 2024-04-30

## 2024-04-30 NOTE — PROGRESS NOTES
"Subjective   Scottieana Butler is a 63 y.o. male who presents for Follow-up.  HPI  Mr. Butler is here today for follow up.     Shoulder pain improved but still happens occasionally. Better managed.     Pain under bilateral ribs  Can be present in the morning   Pain doesn't change based on position  Has been pretty sedentary,got away from going to the gym.   Unsure if it is acid reflux.  Denies change to bowel pattern. Denies unilateral pain or associated nausea or vomiting.     Notes he is here with the intention of getting blood work. Has concern for diabetes. Has been urinating more   Denies increased thirst.    Increased urgency to go, can have some mild leakage. Does have BPH. On Flomax.     Ongoing neuropathic pain, mortons neuroma. He takes gabapentin just once daily, at bedtime, which does help with temporary management. Open to trial of BID dosing to see if pain coverage is improved.     Ongoing benign essential tremor. Endorses high daily caffeine intake, poor hydration. He does not always eat consistently and will consume alcohol several times per week. He is currently on 80mg of propranolol. He is unsure of controllable factors and potential effect on tremor status. He will begin to monitor and adjust. He declines neurology and occupational therapy consultation at this time.     All systems reviewed. Review of systems negative except for noted positives in HPI    Objective     /79   Pulse 75   Temp 36.6 °C (97.8 °F)   Ht 1.803 m (5' 11\")   Wt 106 kg (233 lb 8 oz)   BMI 32.57 kg/m²    Vital signs noted and reviewed.       Physical Exam  Constitutional:       Appearance: Normal appearance.   Cardiovascular:      Rate and Rhythm: Normal rate and regular rhythm.   Pulmonary:      Effort: Pulmonary effort is normal. No respiratory distress.      Breath sounds: Normal breath sounds.   Skin:     General: Skin is warm and dry.   Neurological:      Mental Status: He is oriented to person, place, and time. "   Psychiatric:         Mood and Affect: Mood normal.             Assessment/Plan   Problem List Items Addressed This Visit       Allergic rhinitis    Relevant Medications    fluticasone (Flonase) 50 mcg/actuation nasal spray    BPH (benign prostatic hyperplasia)    Relevant Orders    Basic Metabolic Panel    CBC    Naidu neuroma    Relevant Medications    gabapentin (Neurontin) 600 mg tablet    Tremor of right hand    Vitamin D deficiency    Relevant Orders    Vitamin D 25-Hydroxy,Total (for eval of Vitamin D levels)     Other Visit Diagnoses       Urinary urgency    -  Primary    Relevant Orders    Hemoglobin A1C    Referral to Urology    POCT UA (nonautomated) manually resulted (Completed)    Urine Culture    Basic Metabolic Panel    CBC    Hyperglycemia        Relevant Orders    Hemoglobin A1C

## 2024-04-30 NOTE — PATIENT INSTRUCTIONS
Thank you for coming in for your visit today!    Please follow up in 6 months or sooner if needed.    Today we completed blood work. We will contact you with any abnormalities from this testing.    Stop at the  to schedule with urology.     Be mindful of hydration status, caffeine intake, alcohol consumption, sleep, and blood sugar and the impact on your tremor.   If you would like to get connected with neurology and/or occupational therapy please let us know at any time.     Increase gabapentin to twice daily.     Call 911 or go to the emergency room if you have pain in your chest, difficulty breathing, or other life threatening symptoms.

## 2024-05-01 LAB
ANION GAP SERPL CALC-SCNC: 14 MMOL/L (ref 10–20)
BUN SERPL-MCNC: 16 MG/DL (ref 6–23)
CALCIUM SERPL-MCNC: 9.9 MG/DL (ref 8.6–10.6)
CHLORIDE SERPL-SCNC: 107 MMOL/L (ref 98–107)
CO2 SERPL-SCNC: 22 MMOL/L (ref 21–32)
CREAT SERPL-MCNC: 1.08 MG/DL (ref 0.5–1.3)
EGFRCR SERPLBLD CKD-EPI 2021: 76 ML/MIN/1.73M*2
GLUCOSE SERPL-MCNC: 95 MG/DL (ref 74–99)
POTASSIUM SERPL-SCNC: 4.4 MMOL/L (ref 3.5–5.3)
SODIUM SERPL-SCNC: 139 MMOL/L (ref 136–145)

## 2024-05-02 LAB — BACTERIA UR CULT: NORMAL

## 2024-05-11 ENCOUNTER — APPOINTMENT (OUTPATIENT)
Dept: RADIOLOGY | Facility: HOSPITAL | Age: 64
End: 2024-05-11
Payer: MEDICARE

## 2024-05-11 ENCOUNTER — HOSPITAL ENCOUNTER (EMERGENCY)
Facility: HOSPITAL | Age: 64
Discharge: HOME | End: 2024-05-11
Attending: STUDENT IN AN ORGANIZED HEALTH CARE EDUCATION/TRAINING PROGRAM
Payer: MEDICARE

## 2024-05-11 VITALS
SYSTOLIC BLOOD PRESSURE: 150 MMHG | WEIGHT: 230 LBS | OXYGEN SATURATION: 98 % | HEIGHT: 71 IN | BODY MASS INDEX: 32.2 KG/M2 | RESPIRATION RATE: 18 BRPM | TEMPERATURE: 96.6 F | HEART RATE: 67 BPM | DIASTOLIC BLOOD PRESSURE: 69 MMHG

## 2024-05-11 DIAGNOSIS — S63.299A DISLOCATION OF DISTAL INTERPHALANGEAL (DIP) JOINT OF FINGER, INITIAL ENCOUNTER: ICD-10-CM

## 2024-05-11 DIAGNOSIS — S62.354A CLOSED NONDISPLACED FRACTURE OF SHAFT OF FOURTH METACARPAL BONE OF RIGHT HAND, INITIAL ENCOUNTER: Primary | ICD-10-CM

## 2024-05-11 PROCEDURE — 73140 X-RAY EXAM OF FINGER(S): CPT | Mod: RT

## 2024-05-11 PROCEDURE — 73140 X-RAY EXAM OF FINGER(S): CPT | Mod: RIGHT SIDE | Performed by: RADIOLOGY

## 2024-05-11 PROCEDURE — 26770 TREAT FINGER DISLOCATION: CPT | Mod: F8

## 2024-05-11 PROCEDURE — 29125 APPL SHORT ARM SPLINT STATIC: CPT | Mod: RT

## 2024-05-11 PROCEDURE — 73130 X-RAY EXAM OF HAND: CPT | Mod: RT

## 2024-05-11 PROCEDURE — 73130 X-RAY EXAM OF HAND: CPT | Mod: RIGHT SIDE | Performed by: RADIOLOGY

## 2024-05-11 PROCEDURE — 99284 EMERGENCY DEPT VISIT MOD MDM: CPT | Mod: 25

## 2024-05-11 ASSESSMENT — PAIN DESCRIPTION - PAIN TYPE: TYPE: ACUTE PAIN

## 2024-05-11 ASSESSMENT — COLUMBIA-SUICIDE SEVERITY RATING SCALE - C-SSRS
6. HAVE YOU EVER DONE ANYTHING, STARTED TO DO ANYTHING, OR PREPARED TO DO ANYTHING TO END YOUR LIFE?: NO
1. IN THE PAST MONTH, HAVE YOU WISHED YOU WERE DEAD OR WISHED YOU COULD GO TO SLEEP AND NOT WAKE UP?: NO
2. HAVE YOU ACTUALLY HAD ANY THOUGHTS OF KILLING YOURSELF?: NO

## 2024-05-11 ASSESSMENT — PAIN DESCRIPTION - ORIENTATION: ORIENTATION: RIGHT

## 2024-05-11 ASSESSMENT — PAIN - FUNCTIONAL ASSESSMENT: PAIN_FUNCTIONAL_ASSESSMENT: 0-10

## 2024-05-11 ASSESSMENT — LIFESTYLE VARIABLES
HAVE YOU EVER FELT YOU SHOULD CUT DOWN ON YOUR DRINKING: NO
EVER HAD A DRINK FIRST THING IN THE MORNING TO STEADY YOUR NERVES TO GET RID OF A HANGOVER: NO
TOTAL SCORE: 0
EVER FELT BAD OR GUILTY ABOUT YOUR DRINKING: NO
HAVE PEOPLE ANNOYED YOU BY CRITICIZING YOUR DRINKING: NO

## 2024-05-11 ASSESSMENT — PAIN SCALES - GENERAL
PAINLEVEL_OUTOF10: 0 - NO PAIN
PAINLEVEL_OUTOF10: 4

## 2024-05-11 ASSESSMENT — PAIN DESCRIPTION - DESCRIPTORS: DESCRIPTORS: ACHING

## 2024-05-11 ASSESSMENT — PAIN DESCRIPTION - LOCATION: LOCATION: FINGER (COMMENT WHICH ONE)

## 2024-05-11 ASSESSMENT — PAIN DESCRIPTION - FREQUENCY: FREQUENCY: CONSTANT/CONTINUOUS

## 2024-05-11 NOTE — DISCHARGE INSTRUCTIONS
Your x-rays today showed a dislocation of the distal aspect of the finger, this was reduced.  The fourth metacarpal also was noted to have a fracture in this as well.  You are placed in a splint to keep this immobilized.  Please follow-up with orthopedics soon as you are able to.    Please do not hesitate to return to the ER if you have any worsening of swelling of the hand, loss sensation in your fingers, change in color of the skin of your hand or any new or concerning symptoms.     [facility]  [address] [place], [place] [address] [phone]

## 2024-05-13 ENCOUNTER — OFFICE VISIT (OUTPATIENT)
Dept: ORTHOPEDIC SURGERY | Facility: CLINIC | Age: 64
End: 2024-05-13
Payer: MEDICARE

## 2024-05-13 DIAGNOSIS — S62.201A CLOSED FRACTURE OF FIRST METACARPAL BONE OF RIGHT HAND, UNSPECIFIED FRACTURE MORPHOLOGY, UNSPECIFIED PORTION OF METACARPAL, INITIAL ENCOUNTER: Primary | ICD-10-CM

## 2024-05-13 PROCEDURE — 26600 TREAT METACARPAL FRACTURE: CPT | Performed by: STUDENT IN AN ORGANIZED HEALTH CARE EDUCATION/TRAINING PROGRAM

## 2024-05-13 PROCEDURE — L3809 WHFO W/O JOINTS PRE OTS: HCPCS | Performed by: STUDENT IN AN ORGANIZED HEALTH CARE EDUCATION/TRAINING PROGRAM

## 2024-05-13 PROCEDURE — 99214 OFFICE O/P EST MOD 30 MIN: CPT | Mod: 57 | Performed by: STUDENT IN AN ORGANIZED HEALTH CARE EDUCATION/TRAINING PROGRAM

## 2024-05-13 PROCEDURE — 99204 OFFICE O/P NEW MOD 45 MIN: CPT | Performed by: STUDENT IN AN ORGANIZED HEALTH CARE EDUCATION/TRAINING PROGRAM

## 2024-05-13 PROCEDURE — 1159F MED LIST DOCD IN RCRD: CPT | Performed by: STUDENT IN AN ORGANIZED HEALTH CARE EDUCATION/TRAINING PROGRAM

## 2024-05-13 NOTE — PROGRESS NOTES
History of Present Illness:  Presents for evaluation of right hand.  The patient sustained an acute injury and notes pain and swelling.    The pain is sharp in nature, severe, worse with movement and better with rest.    Had a cabinet fall on his hand dislocated the right ring DIP and sustained fourth metacarpal fracture.  Dislocation reduced at [facility].    The patient's past medical history, family history, social history, and review of systems were reviewed. History is otherwise negative except as stated in the HPI.    Review of Systems   GENERAL: Negative for malaise, significant weight loss, fever  MUSCULOSKELETAL: see HPI  NEURO:  Negative    Physical Examination:  General: Alert and oriented to person, place, and time.  No acute distress and breathing comfortably: Pleasant and cooperative with examination.  HEENT: Head is normocephalic and atraumatic.  Neck: Supple, no visible swelling.  Cardiovascular: No palpable tachycardia  Lungs: No audible wheezing or labored breathing  Abdomen: Nondistended.  On musculoskeletal examination, the patient has full elbow and wrist range of motion. In regards to the hand, there is swelling with some/minimal deformity. There is ecchymosis of the hand, but the skin is intact. Range of motion and strength are limited secondary to pain. There is tenderness to palpation of the right 4th metacarpal. There is no obvious tenderness to palpation about the adjacent metacarpals. Sensation and motor function are intact in the radial, ulnar, and median nerve distribution. The patient has intact flexor and extensor function, but has difficulty making a full fist secondary to pain. There is   no obvious malrotation. The hand itself is warm and well perfused. The contralateral hand and wrist are normal to inspection, range of motion, stability, and strength.    Imaging:  AP, lateral, and oblique radiographs of the right hand demonstrates oblique fourth metacarpal  fracture    Assessment:  Patient with a fourth metacarpal fracture, displaced      Plan:   Observation. I had long conversation with the patient regarding their metacarpal fracture. I discussed the risks/benefits/expected outcomes of both non-operative and operative management. Based on the current alignment of the fracture, I believe that it can be treated non-operatively. This typically results in a very functional finger, but there may be some prominence at the fracture site and/or loss of knuckle contour. The patient is accepting of this. Therefore, I have recommended a ulnar gutter splint in intrinsic position with IPs free.  Finally, the patient understands the risks associated with fracture care, which include but are not limited to, malunion, nonunion, loss of reduction or alignment, post traumatic arthrosis, avascular necrosis, chronic pain or stiffness, tendon rupture, compression neuropathy, and potential need for future surgery. In order to minimize the risk of fracture displacement, I will follow the fracture closely with repeat radiographs. I answered all of their questions.    Follow up: 4 weeks with XR out of [facility]    Jaylin Conn MD

## 2024-05-28 DIAGNOSIS — N40.0 BENIGN PROSTATIC HYPERPLASIA, UNSPECIFIED WHETHER LOWER URINARY TRACT SYMPTOMS PRESENT: ICD-10-CM

## 2024-05-28 RX ORDER — TAMSULOSIN HYDROCHLORIDE 0.4 MG/1
0.4 CAPSULE ORAL 2 TIMES DAILY
Qty: 60 CAPSULE | Refills: 5 | Status: SHIPPED | OUTPATIENT
Start: 2024-05-28

## 2024-05-31 DIAGNOSIS — N40.0 BENIGN PROSTATIC HYPERPLASIA, UNSPECIFIED WHETHER LOWER URINARY TRACT SYMPTOMS PRESENT: ICD-10-CM

## 2024-05-31 RX ORDER — TADALAFIL 5 MG/1
5 TABLET ORAL DAILY PRN
Qty: 10 TABLET | Refills: 0 | Status: SHIPPED | OUTPATIENT
Start: 2024-05-31

## 2024-06-06 ENCOUNTER — OFFICE VISIT (OUTPATIENT)
Dept: UROLOGY | Facility: CLINIC | Age: 64
End: 2024-06-06
Payer: MEDICARE

## 2024-06-06 VITALS
BODY MASS INDEX: 31.92 KG/M2 | HEIGHT: 71 IN | HEART RATE: 88 BPM | WEIGHT: 228 LBS | TEMPERATURE: 97.6 F | SYSTOLIC BLOOD PRESSURE: 121 MMHG | DIASTOLIC BLOOD PRESSURE: 83 MMHG | OXYGEN SATURATION: 98 %

## 2024-06-06 DIAGNOSIS — N13.8 BENIGN PROSTATIC HYPERPLASIA WITH URINARY OBSTRUCTION: Primary | ICD-10-CM

## 2024-06-06 DIAGNOSIS — N40.0 BENIGN PROSTATIC HYPERPLASIA, UNSPECIFIED WHETHER LOWER URINARY TRACT SYMPTOMS PRESENT: ICD-10-CM

## 2024-06-06 DIAGNOSIS — N40.1 BENIGN PROSTATIC HYPERPLASIA WITH URINARY OBSTRUCTION: Primary | ICD-10-CM

## 2024-06-06 PROCEDURE — 3079F DIAST BP 80-89 MM HG: CPT | Performed by: PHYSICIAN ASSISTANT

## 2024-06-06 PROCEDURE — 1126F AMNT PAIN NOTED NONE PRSNT: CPT | Performed by: PHYSICIAN ASSISTANT

## 2024-06-06 PROCEDURE — 99203 OFFICE O/P NEW LOW 30 MIN: CPT | Performed by: PHYSICIAN ASSISTANT

## 2024-06-06 PROCEDURE — 3074F SYST BP LT 130 MM HG: CPT | Performed by: PHYSICIAN ASSISTANT

## 2024-06-06 PROCEDURE — 51798 US URINE CAPACITY MEASURE: CPT | Performed by: PHYSICIAN ASSISTANT

## 2024-06-06 PROCEDURE — 84153 ASSAY OF PSA TOTAL: CPT | Performed by: PHYSICIAN ASSISTANT

## 2024-06-06 PROCEDURE — 36415 COLL VENOUS BLD VENIPUNCTURE: CPT | Performed by: PHYSICIAN ASSISTANT

## 2024-06-06 PROCEDURE — 99213 OFFICE O/P EST LOW 20 MIN: CPT | Performed by: PHYSICIAN ASSISTANT

## 2024-06-06 RX ORDER — TADALAFIL 5 MG/1
5 TABLET ORAL DAILY
Qty: 90 TABLET | Refills: 3 | Status: SHIPPED | OUTPATIENT
Start: 2024-06-06 | End: 2025-06-06

## 2024-06-06 ASSESSMENT — ENCOUNTER SYMPTOMS
ENDOCRINE NEGATIVE: 1
HEMATOLOGIC/LYMPHATIC NEGATIVE: 1
PSYCHIATRIC NEGATIVE: 1
GASTROINTESTINAL NEGATIVE: 1
RESPIRATORY NEGATIVE: 1
MUSCULOSKELETAL NEGATIVE: 1
CARDIOVASCULAR NEGATIVE: 1
NEUROLOGICAL NEGATIVE: 1
EYES NEGATIVE: 1
CONSTITUTIONAL NEGATIVE: 1
ALLERGIC/IMMUNOLOGIC NEGATIVE: 1

## 2024-06-06 ASSESSMENT — PAIN SCALES - GENERAL: PAINLEVEL: 0-NO PAIN

## 2024-06-06 NOTE — PROGRESS NOTES
Subjective   Patient ID: Scottie Butler is a 63 y.o. male who presents for No chief complaint on file..  HPI  Patient is a 64 yo male kindly referred by Libertad galarza for evaluation for urinary urgency.    Patient reports urinary urgency at night. He reports nocturia x 1-2 depending on fluid intake. He reports intermittent urgency incontinence. He denies any bothersome urinary symptoms during the day.   Drinking beer exacerbated urinary frequency and urgency. He denies slow urinary stream or straining with urination.     Patient is on Flomax 0.4 mg BID. He was on daily cialis in the past         Patient due for a PSA    Review of Systems   Constitutional: Negative.    HENT: Negative.     Eyes: Negative.    Respiratory: Negative.     Cardiovascular: Negative.    Gastrointestinal: Negative.    Endocrine: Negative.    Genitourinary:  Positive for urgency.   Musculoskeletal: Negative.    Skin: Negative.    Allergic/Immunologic: Negative.    Neurological: Negative.    Hematological: Negative.    Psychiatric/Behavioral: Negative.         Objective   Physical Exam  Constitutional:       General: He is not in acute distress.     Appearance: Normal appearance.   HENT:      Head: Normocephalic and atraumatic.      Nose: Nose normal.      Mouth/Throat:      Mouth: Mucous membranes are moist.   Cardiovascular:      Rate and Rhythm: Normal rate.   Pulmonary:      Effort: Pulmonary effort is normal.   Abdominal:      General: Abdomen is flat.      Palpations: Abdomen is soft.   Genitourinary:     Penis: Normal.       Testes: Normal.   Musculoskeletal:      Cervical back: Normal range of motion.   Neurological:      Mental Status: He is alert.         Assessment/Plan     BPH  I discussed continuing Flomax 0.8 mg. I advised restarting daily Cialis.    I discussed stopping fluids 2 hours prior to bed. Reduce drinking beer and only ingest it early in the evening.        Charles Constantino PA-C 06/06/24 12:28 PM

## 2024-06-07 LAB — PSA SERPL-MCNC: 0.86 NG/ML

## 2024-06-10 ENCOUNTER — OFFICE VISIT (OUTPATIENT)
Dept: ORTHOPEDIC SURGERY | Facility: CLINIC | Age: 64
End: 2024-06-10
Payer: MEDICARE

## 2024-06-10 ENCOUNTER — HOSPITAL ENCOUNTER (OUTPATIENT)
Dept: RADIOLOGY | Facility: CLINIC | Age: 64
Discharge: HOME | End: 2024-06-10
Payer: MEDICARE

## 2024-06-10 DIAGNOSIS — S62.201A CLOSED FRACTURE OF FIRST METACARPAL BONE OF RIGHT HAND, UNSPECIFIED FRACTURE MORPHOLOGY, UNSPECIFIED PORTION OF METACARPAL, INITIAL ENCOUNTER: ICD-10-CM

## 2024-06-10 PROCEDURE — 73140 X-RAY EXAM OF FINGER(S): CPT | Mod: RT

## 2024-06-10 PROCEDURE — 99024 POSTOP FOLLOW-UP VISIT: CPT | Performed by: STUDENT IN AN ORGANIZED HEALTH CARE EDUCATION/TRAINING PROGRAM

## 2024-06-10 PROCEDURE — 73140 X-RAY EXAM OF FINGER(S): CPT | Mod: RIGHT SIDE | Performed by: STUDENT IN AN ORGANIZED HEALTH CARE EDUCATION/TRAINING PROGRAM

## 2024-06-10 PROCEDURE — 1159F MED LIST DOCD IN RCRD: CPT | Performed by: STUDENT IN AN ORGANIZED HEALTH CARE EDUCATION/TRAINING PROGRAM

## 2024-06-10 NOTE — PROGRESS NOTES
History of Present Illness:  Patient returns today endorsing minimalpain.  Denies any numbness or tingling.     Review of Systems   GENERAL: Negative for malaise, significant weight loss, fever  MUSCULOSKELETAL: see HPI  NEURO:  Negative    Physical Examination:  Mild swelling, skin healthy and intact  Mild tenderness to palpation 4th metacarpal fx  + EPL, FPL, TIERA  + SILT median, radial, ulnar nerve distribution   Good cap refill    Imaging:  Appropriate position and alignment    Assessment:   Patient with a healing 4th metacarpal fx    Plan:  Immobilization: cast/splint removed   Encouraged ROM of digits.  Discussed rehab goals and return to activity.  Follow-up: 1 month for motion check      Jaylin Varela MD

## 2024-07-08 DIAGNOSIS — I10 PRIMARY HYPERTENSION: ICD-10-CM

## 2024-07-08 DIAGNOSIS — G57.63 MORTON'S NEUROMA OF BOTH FEET: ICD-10-CM

## 2024-07-08 DIAGNOSIS — J30.9 ALLERGIC RHINITIS, UNSPECIFIED SEASONALITY, UNSPECIFIED TRIGGER: ICD-10-CM

## 2024-07-08 RX ORDER — LISINOPRIL 20 MG/1
20 TABLET ORAL DAILY
Qty: 90 TABLET | Refills: 3 | Status: SHIPPED | OUTPATIENT
Start: 2024-07-08

## 2024-07-08 RX ORDER — FLUTICASONE PROPIONATE 50 MCG
2 SPRAY, SUSPENSION (ML) NASAL DAILY
Qty: 16 G | Refills: 3 | Status: SHIPPED | OUTPATIENT
Start: 2024-07-08

## 2024-07-08 RX ORDER — GABAPENTIN 600 MG/1
600 TABLET ORAL 2 TIMES DAILY
Qty: 60 TABLET | Refills: 6 | Status: SHIPPED | OUTPATIENT
Start: 2024-07-08

## 2024-08-08 ENCOUNTER — APPOINTMENT (OUTPATIENT)
Dept: UROLOGY | Facility: CLINIC | Age: 64
End: 2024-08-08
Payer: MEDICARE

## 2024-09-04 ENCOUNTER — APPOINTMENT (OUTPATIENT)
Dept: DERMATOLOGY | Facility: CLINIC | Age: 64
End: 2024-09-04
Payer: MEDICARE

## 2024-09-04 DIAGNOSIS — Z12.83 ENCOUNTER FOR SCREENING FOR MALIGNANT NEOPLASM OF SKIN: Primary | ICD-10-CM

## 2024-09-04 DIAGNOSIS — D18.01 HEMANGIOMA OF SKIN: ICD-10-CM

## 2024-09-04 DIAGNOSIS — L57.8 PHOTOAGING OF SKIN: ICD-10-CM

## 2024-09-04 DIAGNOSIS — L82.1 SEBORRHEIC KERATOSIS: ICD-10-CM

## 2024-09-04 DIAGNOSIS — G57.63 MORTON'S NEUROMA OF BOTH FEET: ICD-10-CM

## 2024-09-04 DIAGNOSIS — D22.5 MELANOCYTIC NEVI OF TRUNK: ICD-10-CM

## 2024-09-04 DIAGNOSIS — L81.4 LENTIGO: ICD-10-CM

## 2024-09-04 PROCEDURE — 1159F MED LIST DOCD IN RCRD: CPT | Performed by: DERMATOLOGY

## 2024-09-04 PROCEDURE — 99213 OFFICE O/P EST LOW 20 MIN: CPT | Performed by: DERMATOLOGY

## 2024-09-04 ASSESSMENT — DERMATOLOGY QUALITY OF LIFE (QOL) ASSESSMENT
ARE THERE EXCLUSIONS OR EXCEPTIONS FOR THE QUALITY OF LIFE ASSESSMENT: NO
DATE THE QUALITY-OF-LIFE ASSESSMENT WAS COMPLETED: 67087
RATE HOW BOTHERED YOU ARE BY SYMPTOMS OF YOUR SKIN PROBLEM (EG, ITCHING, STINGING BURNING, HURTING OR SKIN IRRITATION): 6 - ALWAYS BOTHERED
RATE HOW BOTHERED YOU ARE BY EFFECTS OF YOUR SKIN PROBLEMS ON YOUR ACTIVITIES (EG, GOING OUT, ACCOMPLISHING WHAT YOU WANT, WORK ACTIVITIES OR YOUR RELATIONSHIPS WITH OTHERS): 0 - NEVER BOTHERED
WHAT SINGLE SKIN CONDITION LISTED BELOW IS THE PATIENT ANSWERING THE QUALITY-OF-LIFE ASSESSMENT QUESTIONS ABOUT: NONE OF THE ABOVE
RATE HOW EMOTIONALLY BOTHERED YOU ARE BY YOUR SKIN PROBLEM (FOR EXAMPLE, WORRY, EMBARRASSMENT, FRUSTRATION): 6 - ALWAYS BOTHERED

## 2024-09-04 ASSESSMENT — PATIENT GLOBAL ASSESSMENT (PGA): PATIENT GLOBAL ASSESSMENT: PATIENT GLOBAL ASSESSMENT:  2 - MILD

## 2024-09-04 ASSESSMENT — ITCH NUMERIC RATING SCALE: HOW SEVERE IS YOUR ITCHING?: 0

## 2024-09-04 ASSESSMENT — DERMATOLOGY PATIENT ASSESSMENT
DO YOU USE A TANNING BED: NO
ARE YOU AN ORGAN TRANSPLANT RECIPIENT: NO
DO YOU HAVE ANY NEW OR CHANGING LESIONS: YES
HAVE YOU HAD OR DO YOU HAVE A STAPH INFECTION: NO
HAVE YOU HAD OR DO YOU HAVE VASCULAR DISEASE: NO

## 2024-09-04 NOTE — PROGRESS NOTES
Subjective     Scottie Butler is a 64 y.o. male who presents for the following: Skin Check (Pt here for FBSE with family hx - SCC, Brother. Pt reports areas of concern located on Back and Right Posterior Ear. ).     Review of Systems:  No other skin or systemic complaints other than what is documented elsewhere in the note.    The following portions of the chart were reviewed this encounter and updated as appropriate:         Skin Cancer History  No skin cancer on file.      Specialty Problems          Dermatology Problems    Folliculitis        Objective   Well appearing patient in no apparent distress; mood and affect are within normal limits.    A full examination was performed including scalp, head, eyes, ears, nose, lips, neck, chest, axillae, abdomen, back, buttocks, bilateral upper extremities, bilateral lower extremities, hands, feet, fingers, toes, fingernails, and toenails. All findings within normal limits unless otherwise noted below.    Assessment/Plan   1. Encounter for screening for malignant neoplasm of skin  No suspicious lesions noted on examination today    The risk of chronic, cumulative sun damage and risk of development of skin cancer was reviewed today.   The importance of sun protection was reviewed: including the use of a broad spectrum sunscreen of at least SPF 30 that protects against both UVA/UVB rays, with ingredients such as Zinc oxide or titanium dioxide, wearing sun protective clothing and sun avoidance. We reviewed the warning signs of non-melanoma skin cancer and ABCDEs of melanoma  Please follow up should you notice any new or changing pre-existing skin lesion.    2. Hemangioma of skin  Cherry red papules    The benign nature of these skin lesions were reviewed, no treatment is necessary.   Please follow up for any new or pre-existing lesion that is changing in size, shape, color, becomes painful, tender, itches or bleed.    3. Photoaging of skin  Mottled pigmentation with  telangiectasias and brown reticular macules in sun exposed areas of the body.    The risk of chronic, cumulative sun damage and risk of development of skin cancer was reviewed today.   The importance of sun protection was reviewed: including the use of a broad spectrum sunscreen of at least SPF 30 that protects against both UVA/UVB rays, with ingredients such as Zinc oxide or titanium dioxide, wearing sun protective clothing and sun avoidance. We reviewed the warning signs of non-melanoma skin cancer and ABCDEs of melanoma  Please follow up should you notice any new or changing pre-existing skin lesion.    4. Seborrheic keratosis (5)  Generalized, Left Lower Back, Mid Back, Right Lower Back, Right Occipital Scalp  Brown, tan waxy macules and stuck on appearing papules and plaques    The benign nature of these skin lesions reviewed, reassure provided and no further treatment needed at this time.   These lesions can be removed, if symptomatic (itching, bleeding, rubbing on clothing, painful), otherwise removal is considered cosmetic.     3 lesions on the back treated with LN2 as courtesy. The risks and benefits of LN2 were reviewed including incomplete removal, crusting, blister hypo and/or hyperpigmentation, scarring and recurrence.      Destr of lesion - Left Lower Back, Mid Back, Right Lower Back  Complexity: simple    Destruction method: cryotherapy    Informed consent: discussed and consent obtained    Lesion destroyed using liquid nitrogen: Yes    Cryotherapy cycles:  2  Outcome: patient tolerated procedure well with no complications    Post-procedure details: wound care instructions given      5. Melanocytic nevi of trunk (3)  Abdomen (Lower Torso, Anterior), Chest (Upper Torso, Anterior), Torso - Posterior (Back)  Tan-brown symmetric macules and papules    Clinically benign appearing nevi, no treatment is necessary.  The importance of sun protection was reviewed: including the use of a broad spectrum sunscreen  that protects against both UVA/UVB rays, with ingredients such as Zinc oxide or titanium dioxide, wearing sun protective clothing and sun avoidance.   ABCDEs of melanoma reviewed.  Please follow up should you notice any new or changing pre-existing skin lesion.    6. Lentigo  Scattered tan macules in sun-exposed areas.    These are benign skin lesions due to sun exposure. They will darken in response to sun exposure. They should be monitored for change in size, shape or color.  These lesions can be treated cosmetically with topical creams, liquid nitrogen and a variety of lasers.    Follow up in 1-2 years for FBSE or sooner if needed.

## 2024-09-06 RX ORDER — GABAPENTIN 600 MG/1
600 TABLET ORAL 2 TIMES DAILY
Qty: 60 TABLET | Refills: 6 | Status: SHIPPED | OUTPATIENT
Start: 2024-09-06

## 2024-10-09 ENCOUNTER — TELEPHONE (OUTPATIENT)
Dept: PRIMARY CARE | Facility: CLINIC | Age: 64
End: 2024-10-09
Payer: MEDICARE

## 2024-10-09 NOTE — TELEPHONE ENCOUNTER
Pt called requesting refill of lisinopril and Flonase. Noted pt not seen in this clinic. Noted available refills. Call placed to pt to make aware of both. No answer, voicemail left with update.

## 2024-10-30 ENCOUNTER — APPOINTMENT (OUTPATIENT)
Dept: PRIMARY CARE | Facility: CLINIC | Age: 64
End: 2024-10-30
Payer: MEDICARE

## 2024-11-13 ENCOUNTER — APPOINTMENT (OUTPATIENT)
Dept: PRIMARY CARE | Facility: CLINIC | Age: 64
End: 2024-11-13
Payer: MEDICARE

## 2024-11-14 DIAGNOSIS — J30.9 ALLERGIC RHINITIS, UNSPECIFIED SEASONALITY, UNSPECIFIED TRIGGER: ICD-10-CM

## 2024-11-18 RX ORDER — FLUTICASONE PROPIONATE 50 MCG
2 SPRAY, SUSPENSION (ML) NASAL DAILY
Qty: 16 G | Refills: 3 | Status: SHIPPED | OUTPATIENT
Start: 2024-11-18

## 2025-02-16 DIAGNOSIS — E78.5 HYPERLIPIDEMIA, UNSPECIFIED HYPERLIPIDEMIA TYPE: ICD-10-CM

## 2025-02-18 RX ORDER — ATORVASTATIN CALCIUM 20 MG/1
20 TABLET, FILM COATED ORAL NIGHTLY
Qty: 90 TABLET | Refills: 0 | Status: SHIPPED | OUTPATIENT
Start: 2025-02-18

## 2025-02-23 DIAGNOSIS — J30.9 ALLERGIC RHINITIS, UNSPECIFIED SEASONALITY, UNSPECIFIED TRIGGER: ICD-10-CM

## 2025-02-24 RX ORDER — FLUTICASONE PROPIONATE 50 MCG
2 SPRAY, SUSPENSION (ML) NASAL DAILY
Qty: 48 ML | Refills: 1 | Status: SHIPPED | OUTPATIENT
Start: 2025-02-24

## 2025-04-04 DIAGNOSIS — N40.0 BENIGN PROSTATIC HYPERPLASIA, UNSPECIFIED WHETHER LOWER URINARY TRACT SYMPTOMS PRESENT: ICD-10-CM

## 2025-04-04 RX ORDER — TAMSULOSIN HYDROCHLORIDE 0.4 MG/1
0.4 CAPSULE ORAL 2 TIMES DAILY
Qty: 60 CAPSULE | Refills: 0 | Status: SHIPPED | OUTPATIENT
Start: 2025-04-04 | End: 2025-04-09 | Stop reason: SDUPTHER

## 2025-04-09 ENCOUNTER — HOSPITAL ENCOUNTER (OUTPATIENT)
Dept: RADIOLOGY | Facility: CLINIC | Age: 65
Discharge: HOME | End: 2025-04-09
Payer: MEDICARE

## 2025-04-09 ENCOUNTER — OFFICE VISIT (OUTPATIENT)
Dept: PRIMARY CARE | Facility: CLINIC | Age: 65
End: 2025-04-09
Payer: MEDICARE

## 2025-04-09 VITALS
TEMPERATURE: 97.3 F | OXYGEN SATURATION: 100 % | RESPIRATION RATE: 16 BRPM | WEIGHT: 229 LBS | HEIGHT: 71 IN | BODY MASS INDEX: 32.06 KG/M2 | DIASTOLIC BLOOD PRESSURE: 76 MMHG | HEART RATE: 76 BPM | SYSTOLIC BLOOD PRESSURE: 136 MMHG

## 2025-04-09 DIAGNOSIS — I10 PRIMARY HYPERTENSION: ICD-10-CM

## 2025-04-09 DIAGNOSIS — Z00.00 HEALTH MAINTENANCE EXAMINATION: ICD-10-CM

## 2025-04-09 DIAGNOSIS — Z00.00 MEDICARE ANNUAL WELLNESS VISIT, SUBSEQUENT: Primary | ICD-10-CM

## 2025-04-09 DIAGNOSIS — G57.63 MORTON'S NEUROMA OF BOTH FEET: ICD-10-CM

## 2025-04-09 DIAGNOSIS — M25.511 CHRONIC PAIN OF BOTH SHOULDERS: ICD-10-CM

## 2025-04-09 DIAGNOSIS — G89.29 CHRONIC PAIN OF BOTH SHOULDERS: ICD-10-CM

## 2025-04-09 DIAGNOSIS — M25.512 CHRONIC PAIN OF BOTH SHOULDERS: ICD-10-CM

## 2025-04-09 DIAGNOSIS — N40.0 BENIGN PROSTATIC HYPERPLASIA, UNSPECIFIED WHETHER LOWER URINARY TRACT SYMPTOMS PRESENT: ICD-10-CM

## 2025-04-09 DIAGNOSIS — E78.5 HYPERLIPIDEMIA, UNSPECIFIED HYPERLIPIDEMIA TYPE: ICD-10-CM

## 2025-04-09 DIAGNOSIS — N13.8 BENIGN PROSTATIC HYPERPLASIA WITH URINARY OBSTRUCTION: ICD-10-CM

## 2025-04-09 DIAGNOSIS — N40.1 BENIGN PROSTATIC HYPERPLASIA WITH URINARY OBSTRUCTION: ICD-10-CM

## 2025-04-09 PROCEDURE — 1170F FXNL STATUS ASSESSED: CPT | Performed by: FAMILY MEDICINE

## 2025-04-09 PROCEDURE — 1159F MED LIST DOCD IN RCRD: CPT | Performed by: FAMILY MEDICINE

## 2025-04-09 PROCEDURE — 73030 X-RAY EXAM OF SHOULDER: CPT | Mod: RT

## 2025-04-09 PROCEDURE — 3008F BODY MASS INDEX DOCD: CPT | Performed by: FAMILY MEDICINE

## 2025-04-09 PROCEDURE — 1125F AMNT PAIN NOTED PAIN PRSNT: CPT | Performed by: FAMILY MEDICINE

## 2025-04-09 PROCEDURE — 99213 OFFICE O/P EST LOW 20 MIN: CPT | Performed by: FAMILY MEDICINE

## 2025-04-09 PROCEDURE — 1160F RVW MEDS BY RX/DR IN RCRD: CPT | Performed by: FAMILY MEDICINE

## 2025-04-09 PROCEDURE — 73030 X-RAY EXAM OF SHOULDER: CPT | Mod: LT

## 2025-04-09 PROCEDURE — 73030 X-RAY EXAM OF SHOULDER: CPT | Mod: LEFT SIDE | Performed by: RADIOLOGY

## 2025-04-09 PROCEDURE — 3078F DIAST BP <80 MM HG: CPT | Performed by: FAMILY MEDICINE

## 2025-04-09 PROCEDURE — 3075F SYST BP GE 130 - 139MM HG: CPT | Performed by: FAMILY MEDICINE

## 2025-04-09 PROCEDURE — G0439 PPPS, SUBSEQ VISIT: HCPCS | Performed by: FAMILY MEDICINE

## 2025-04-09 PROCEDURE — 99215 OFFICE O/P EST HI 40 MIN: CPT | Performed by: FAMILY MEDICINE

## 2025-04-09 RX ORDER — TAMSULOSIN HYDROCHLORIDE 0.4 MG/1
0.4 CAPSULE ORAL 2 TIMES DAILY
Qty: 180 CAPSULE | Refills: 3 | Status: SHIPPED | OUTPATIENT
Start: 2025-04-09

## 2025-04-09 RX ORDER — LISINOPRIL 20 MG/1
20 TABLET ORAL DAILY
Qty: 90 TABLET | Refills: 3 | Status: SHIPPED | OUTPATIENT
Start: 2025-04-09

## 2025-04-09 RX ORDER — TADALAFIL 5 MG/1
5 TABLET ORAL DAILY
Qty: 90 TABLET | Refills: 3 | Status: SHIPPED | OUTPATIENT
Start: 2025-04-09 | End: 2026-04-09

## 2025-04-09 RX ORDER — ATORVASTATIN CALCIUM 20 MG/1
20 TABLET, FILM COATED ORAL NIGHTLY
Qty: 90 TABLET | Refills: 3 | Status: SHIPPED | OUTPATIENT
Start: 2025-04-09

## 2025-04-09 RX ORDER — GABAPENTIN 600 MG/1
600 TABLET ORAL 2 TIMES DAILY
Qty: 180 TABLET | Refills: 3 | Status: SHIPPED | OUTPATIENT
Start: 2025-04-09

## 2025-04-09 ASSESSMENT — ACTIVITIES OF DAILY LIVING (ADL)
BATHING: INDEPENDENT
DOING_HOUSEWORK: INDEPENDENT
DRESSING: INDEPENDENT
GROCERY_SHOPPING: INDEPENDENT
TAKING_MEDICATION: INDEPENDENT
MANAGING_FINANCES: INDEPENDENT

## 2025-04-09 ASSESSMENT — ENCOUNTER SYMPTOMS
ARTHRALGIAS: 1
RESPIRATORY NEGATIVE: 1
CONSTITUTIONAL NEGATIVE: 1
OCCASIONAL FEELINGS OF UNSTEADINESS: 0
GASTROINTESTINAL NEGATIVE: 1
CARDIOVASCULAR NEGATIVE: 1
LOSS OF SENSATION IN FEET: 0
DEPRESSION: 0

## 2025-04-09 ASSESSMENT — PAIN SCALES - GENERAL: PAINLEVEL_OUTOF10: 4

## 2025-04-09 ASSESSMENT — PATIENT HEALTH QUESTIONNAIRE - PHQ9
2. FEELING DOWN, DEPRESSED OR HOPELESS: NOT AT ALL
SUM OF ALL RESPONSES TO PHQ9 QUESTIONS 1 AND 2: 0
1. LITTLE INTEREST OR PLEASURE IN DOING THINGS: NOT AT ALL

## 2025-04-09 NOTE — PROGRESS NOTES
"Subjective   Reason for Visit: Scottie Butler is an 64 y.o. male here for a Medicare Wellness visit.     HPI  Medicare wellness visit  - Items reviewed in [facility]  - Reviewed medical, surgery and family history  - Reviewed current providers  - Reviewed depression screen  - Reviewed functional ability screenings  - No current advanced care planning  - Reviewed preventive health screenings. Any needed updates will be outlined in assessment and plan     Shoulder pain:  Mr. Butler reports pain and difficulty lifting his left shoulder. He states this started around one year ago, maybe a little longer. Both of his shoulders are sore in the morning, with left worse than the right, and with the pain having gotten worse over time. In the morning he reports the pain is a 6/10, today he took Gabapentin which brought the pain down to a 3-4/10, but did not resolve it. The pain is sharp, even at rest. It improves with stretching and exercise, however, he currently cannot lift the arm above shoulder height.     Hypertension: Compliant with medication. No headaches, chest pain, shortness of breath, vision changes, dizziness    BPH: Symptoms stable. Taking tamsulosin       Social History  Smoking:  half a pack of cigarettes a day for the past 50 years. Has tried to quit a few times, but has returned to smoking. Not interested in discussing cutting back at this time.     Review of Systems   Constitutional: Negative.    HENT: Negative.     Eyes:  Negative for visual disturbance.   Respiratory: Negative.     Cardiovascular: Negative.    Gastrointestinal: Negative.    Genitourinary: Negative.    Musculoskeletal:  Positive for arthralgias.   Skin: Negative.    Allergic/Immunologic: Positive for environmental allergies.     Objective   Vitals:  /76   Pulse 76   Temp 36.3 °C (97.3 °F)   Resp 16   Ht 1.803 m (5' 11\")   Wt 104 kg (229 lb)   SpO2 100%   BMI 31.94 kg/m²       General: well appearing, no distress  Neck: No " lymphadenopathy, no thyromegaly  CV: Regular rate and rhythm, no murmur  Lungs: Clear to auscultation bilaterally  Abdomen: Soft, nontender, nondistended  Extremities: No edema noted  Psych: Appropriate mood and affect  L Shoulder: No erythema, swelling, or ecchymosis. + pain along GH jointline and AC joint. Full strength and ROM with internal rotation, external rotation. Abduction limited on active ROM to 90 degrees. Positive crossover sign. Neers sign, and Stone test negative.   R shoulder: No erythema, swelling, or ecchymosis. + pain along GH jointline and AC joint. Full strength and ROM with internal rotation, external rotation. Abduction causes pain however full ROM.  Negative crossover sign, Neers sign, and Stone test negative.     Assessment & Plan  Hyperlipidemia, unspecified hyperlipidemia type    Orders:    atorvastatin (Lipitor) 20 mg tablet; Take 1 tablet (20 mg) by mouth once daily at bedtime.    Naidu's neuroma of both feet    Orders:    gabapentin (Neurontin) 600 mg tablet; Take 1 tablet (600 mg) by mouth 2 times a day.    Benign prostatic hyperplasia, unspecified whether lower urinary tract symptoms present    Orders:    tamsulosin (Flomax) 0.4 mg 24 hr capsule; Take 1 capsule (0.4 mg) by mouth 2 times a day.    Benign prostatic hyperplasia with urinary obstruction    Orders:    tadalafil (Cialis) 5 mg tablet; Take 1 tablet (5 mg) by mouth once daily.    Health maintenance examination    Orders:    Basic Metabolic Panel; Future    Lipid Panel; Future    Medicare annual wellness visit, subsequent           Scottie FOREMAN Luke is an 64 y.o. male here with a PHM of HTN controlled without medication, dyslipidemia, BPH, and chronic shoulder pain presenting for a Medicare Wellness visit. At this time, the differential diagnosis for his shoulder pain includes osteoarthritis, which is supported by his history of a career as a  with repetitive motion, limited ROM, and pain. A second diagnosis is  adhesive capsulitis, supported by the pain and limited ROM. In order to narrow the differential, will obtain X-Ray imaging.     #Chronic Shoulder Pain   -OA vs adhesive capsulitis   -X-Ray imaging to confirm diagnosis   -Physical therapy referral   -If arthritis is severe, consider corticosteroid injections and/or referral to orthopedics     #Health Maintenance Visit  -Routine labs: cholesterol, renal function panel  -Smoking cessation counseling- precontemplative at this time   -Screening: discussed, but patient not interested at this time, will follow up on:    -Colon cancer screening: colonoscopy or cologuard    -Lung cancer screening   -Ultrasound for AAA   - patient under significant stress with declining health of his son. He would like to revisit these screening tests at a future date.     Jaky Rodriguez, MS3    Patient was seen and examined by myself in conjunction with medical student. I have edited note above. Azeb Newton

## 2025-04-10 NOTE — ASSESSMENT & PLAN NOTE
Orders:    tamsulosin (Flomax) 0.4 mg 24 hr capsule; Take 1 capsule (0.4 mg) by mouth 2 times a day.

## 2025-04-11 ENCOUNTER — TELEPHONE (OUTPATIENT)
Dept: PRIMARY CARE | Facility: CLINIC | Age: 65
End: 2025-04-11
Payer: MEDICARE

## 2025-04-11 DIAGNOSIS — G89.29 CHRONIC PAIN OF BOTH SHOULDERS: Primary | ICD-10-CM

## 2025-04-11 DIAGNOSIS — M25.512 CHRONIC PAIN OF BOTH SHOULDERS: Primary | ICD-10-CM

## 2025-04-11 DIAGNOSIS — M25.511 CHRONIC PAIN OF BOTH SHOULDERS: Primary | ICD-10-CM

## 2025-04-11 RX ORDER — ACETAMINOPHEN 500 MG
1000 TABLET ORAL EVERY 6 HOURS PRN
Qty: 90 TABLET | Refills: 2 | Status: SHIPPED | OUTPATIENT
Start: 2025-04-11

## 2025-04-11 RX ORDER — DICLOFENAC SODIUM 10 MG/G
4 GEL TOPICAL 4 TIMES DAILY
Qty: 100 G | Refills: 1 | Status: SHIPPED | OUTPATIENT
Start: 2025-04-11

## 2025-04-11 NOTE — TELEPHONE ENCOUNTER
Spoke to patient about xray results- discussed PT and/or steroid injections. At this time due to significant stressors at home he would like to hold off. Will do home exercises, take tylenol and voltaren. Will reach out if he would like a referral for additional management

## 2025-05-21 DIAGNOSIS — R25.1 TREMOR OF RIGHT HAND: ICD-10-CM

## 2025-05-21 RX ORDER — PROPRANOLOL HYDROCHLORIDE 80 MG/1
80 CAPSULE, EXTENDED RELEASE ORAL DAILY
Qty: 90 CAPSULE | Refills: 2 | Status: SHIPPED | OUTPATIENT
Start: 2025-05-21 | End: 2026-05-21

## 2025-06-11 ENCOUNTER — APPOINTMENT (OUTPATIENT)
Dept: RADIOLOGY | Facility: HOSPITAL | Age: 65
End: 2025-06-11
Payer: MEDICARE

## 2025-06-11 ENCOUNTER — HOSPITAL ENCOUNTER (EMERGENCY)
Facility: HOSPITAL | Age: 65
Discharge: HOME | End: 2025-06-11
Attending: EMERGENCY MEDICINE
Payer: MEDICARE

## 2025-06-11 VITALS
TEMPERATURE: 96.8 F | HEIGHT: 71 IN | DIASTOLIC BLOOD PRESSURE: 84 MMHG | OXYGEN SATURATION: 98 % | BODY MASS INDEX: 30.8 KG/M2 | HEART RATE: 84 BPM | WEIGHT: 220 LBS | SYSTOLIC BLOOD PRESSURE: 143 MMHG | RESPIRATION RATE: 17 BRPM

## 2025-06-11 DIAGNOSIS — T07.XXXA ABRASIONS OF MULTIPLE SITES: ICD-10-CM
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
## 2025-06-11 DIAGNOSIS — S93.104A DISLOCATION OF PHALANX OF RIGHT FOOT, INITIAL ENCOUNTER: ICD-10-CM

## 2025-06-11 PROCEDURE — 73610 X-RAY EXAM OF ANKLE: CPT | Mod: RIGHT SIDE | Performed by: STUDENT IN AN ORGANIZED HEALTH CARE EDUCATION/TRAINING PROGRAM

## 2025-06-11 PROCEDURE — 73630 X-RAY EXAM OF FOOT: CPT | Mod: RT

## 2025-06-11 PROCEDURE — 73610 X-RAY EXAM OF ANKLE: CPT | Mod: RT

## 2025-06-11 PROCEDURE — 70450 CT HEAD/BRAIN W/O DYE: CPT | Performed by: RADIOLOGY

## 2025-06-11 PROCEDURE — 70450 CT HEAD/BRAIN W/O DYE: CPT

## 2025-06-11 PROCEDURE — 72125 CT NECK SPINE W/O DYE: CPT

## 2025-06-11 PROCEDURE — 96372 THER/PROPH/DIAG INJ SC/IM: CPT

## 2025-06-11 PROCEDURE — 99284 EMERGENCY DEPT VISIT MOD MDM: CPT | Mod: 25 | Performed by: EMERGENCY MEDICINE

## 2025-06-11 PROCEDURE — 90715 TDAP VACCINE 7 YRS/> IM: CPT

## 2025-06-11 PROCEDURE — 73590 X-RAY EXAM OF LOWER LEG: CPT | Mod: LT

## 2025-06-11 PROCEDURE — 2500000004 HC RX 250 GENERAL PHARMACY W/ HCPCS (ALT 636 FOR OP/ED)

## 2025-06-11 PROCEDURE — 73630 X-RAY EXAM OF FOOT: CPT | Mod: RIGHT SIDE | Performed by: STUDENT IN AN ORGANIZED HEALTH CARE EDUCATION/TRAINING PROGRAM

## 2025-06-11 PROCEDURE — 73590 X-RAY EXAM OF LOWER LEG: CPT | Mod: LEFT SIDE | Performed by: STUDENT IN AN ORGANIZED HEALTH CARE EDUCATION/TRAINING PROGRAM

## 2025-06-11 PROCEDURE — 73620 X-RAY EXAM OF FOOT: CPT | Mod: RT

## 2025-06-11 PROCEDURE — 73130 X-RAY EXAM OF HAND: CPT | Mod: RIGHT SIDE | Performed by: STUDENT IN AN ORGANIZED HEALTH CARE EDUCATION/TRAINING PROGRAM

## 2025-06-11 PROCEDURE — 90471 IMMUNIZATION ADMIN: CPT

## 2025-06-11 PROCEDURE — 28630 TREAT TOE DISLOCATION: CPT | Mod: RT

## 2025-06-11 PROCEDURE — 73130 X-RAY EXAM OF HAND: CPT | Mod: RT

## 2025-06-11 PROCEDURE — 72125 CT NECK SPINE W/O DYE: CPT | Performed by: RADIOLOGY

## 2025-06-11 RX ORDER — KETOROLAC TROMETHAMINE 15 MG/ML
15 INJECTION, SOLUTION INTRAMUSCULAR; INTRAVENOUS ONCE
Status: COMPLETED | OUTPATIENT
Start: 2025-06-11 | End: 2025-06-11

## 2025-06-11 RX ORDER — LIDOCAINE HYDROCHLORIDE 10 MG/ML
20 INJECTION, SOLUTION INFILTRATION; PERINEURAL ONCE
Status: COMPLETED | OUTPATIENT
Start: 2025-06-11 | End: 2025-06-11

## 2025-06-11 RX ADMIN — LIDOCAINE HYDROCHLORIDE 20 ML: 10 INJECTION, SOLUTION INFILTRATION; PERINEURAL at 14:03

## 2025-06-11 RX ADMIN — KETOROLAC TROMETHAMINE 15 MG: 15 INJECTION, SOLUTION INTRAMUSCULAR; INTRAVENOUS at 12:28

## 2025-06-11 RX ADMIN — TETANUS TOXOID, REDUCED DIPHTHERIA TOXOID AND ACELLULAR PERTUSSIS VACCINE, ADSORBED 0.5 ML: 5; 2.5; 8; 8; 2.5 SUSPENSION INTRAMUSCULAR at 12:28

## 2025-06-11 ASSESSMENT — PAIN SCALES - GENERAL
PAINLEVEL_OUTOF10: 7
PAINLEVEL_OUTOF10: 6

## 2025-06-11 ASSESSMENT — PAIN DESCRIPTION - LOCATION
LOCATION: FOOT
LOCATION: FOOT

## 2025-06-11 ASSESSMENT — LIFESTYLE VARIABLES
TOTAL SCORE: 0
HAVE YOU EVER FELT YOU SHOULD CUT DOWN ON YOUR DRINKING: NO
EVER FELT BAD OR GUILTY ABOUT YOUR DRINKING: NO
EVER HAD A DRINK FIRST THING IN THE MORNING TO STEADY YOUR NERVES TO GET RID OF A HANGOVER: NO
HAVE PEOPLE ANNOYED YOU BY CRITICIZING YOUR DRINKING: NO

## 2025-06-11 ASSESSMENT — PAIN DESCRIPTION - ORIENTATION
ORIENTATION: RIGHT
ORIENTATION: RIGHT

## 2025-06-11 ASSESSMENT — PAIN DESCRIPTION - PAIN TYPE: TYPE: ACUTE PAIN

## 2025-06-11 ASSESSMENT — PAIN DESCRIPTION - DESCRIPTORS: DESCRIPTORS: ACHING

## 2025-06-11 ASSESSMENT — PAIN DESCRIPTION - ONSET: ONSET: ONGOING

## 2025-06-11 ASSESSMENT — PAIN DESCRIPTION - FREQUENCY: FREQUENCY: CONSTANT/CONTINUOUS

## 2025-06-11 ASSESSMENT — PAIN DESCRIPTION - PROGRESSION: CLINICAL_PROGRESSION: NOT CHANGED

## 2025-06-11 ASSESSMENT — PAIN - FUNCTIONAL ASSESSMENT: PAIN_FUNCTIONAL_ASSESSMENT: 0-10

## 2025-06-11 NOTE — DISCHARGE INSTRUCTIONS
You had a dislocation of your great toe.    You need to follow-up with podiatry for further evaluation.  Keep your toe taped to the toe next to it.    Seek immediate medical attention if you develop: increasing pain, numbness, tingling, weakness, loss of motion in your foot or toes, your foot or toes become pale or cold, or you develop any new or worsening symptoms.    Seek immediate medical attention if your wound develops: redness, swelling, warmth to touch, discharge or drainage, increasing pain, or any new or worsening symptoms.    You may use Tylenol (acetaminophen) or Motrin (ibuprofen) as directed for pain.

## 2025-06-11 NOTE — ED PROVIDER NOTES
HPI   Chief Complaint   Patient presents with    Fall     Fall from a 4ft ladder, pt states he slipped and fell, pt denies hitting head, denies loc, steven blood thinners, pt c/o right foot pain and left calf pain       HPI    Scottie Butler is a 64-year-old male with no significant medical history presenting the ED after fall that occurred about 20 minutes ago.  Patient states he was on a ladder when the ladder slipped from the house was leaning on.  Patient states he landed on the left side of his body.  Patient states he did not hit his head or back during the fall.  Patient denies loss of conscious.  Patient states he does not take blood thinners.  Patient states he has pain to the right hand, right foot, left calf.  Patient denies current head pain, chest pain, shortness of breath, abdominal pain, nausea, vomiting, fevers, body aches, chills, dizziness, blurry vision, back pain.  Patient denies saddle anesthesia, urinary retention, urinary and bowel incontinence.  Patient History   Medical History[1]  Surgical History[2]  Family History[3]  Social History[4]    Physical Exam   ED Triage Vitals [06/11/25 1151]   Temp Heart Rate Respirations BP   -- 75 18 129/83      Pulse Ox Temp src Heart Rate Source Patient Position   96 % -- Monitor Sitting      BP Location FiO2 (%)     Right arm --       Physical Exam  Vitals and nursing note reviewed.   Constitutional:       Appearance: Normal appearance.   HENT:      Head: Normocephalic and atraumatic.   Cardiovascular:      Rate and Rhythm: Normal rate and regular rhythm.      Heart sounds: Normal heart sounds. No murmur heard.     No gallop.   Pulmonary:      Effort: Pulmonary effort is normal. No respiratory distress.      Breath sounds: Normal breath sounds. No stridor. No wheezing, rhonchi or rales.   Abdominal:      General: Abdomen is flat. Bowel sounds are normal. There is no distension.      Palpations: Abdomen is soft. There is no mass.      Tenderness: There is no  abdominal tenderness. There is no guarding or rebound.      Hernia: No hernia is present.   Musculoskeletal:      Right hand: Tenderness and bony tenderness present. No swelling. Normal range of motion. Normal strength. Normal sensation. Normal capillary refill. Normal pulse.      Cervical back: No swelling, tenderness or bony tenderness. No pain with movement. Normal range of motion.      Thoracic back: No swelling, tenderness or bony tenderness. Normal range of motion.      Lumbar back: No swelling, tenderness or bony tenderness. Normal range of motion.      Right lower leg: No tenderness or bony tenderness. No edema.      Left lower leg: Bony tenderness present. No tenderness. No edema.      Right ankle: No swelling. Tenderness present over the lateral malleolus and medial malleolus. No base of 5th metatarsal or proximal fibula tenderness. Decreased range of motion.      Left ankle: No swelling. No tenderness. Normal range of motion.      Right foot: Decreased range of motion. Normal capillary refill. Tenderness and bony tenderness (Right first toe) present. No swelling.      Left foot: Normal range of motion and normal capillary refill. No swelling or bony tenderness.      Comments: Tenderness to the right thenar eminence.  Multiple superficial abrasions to the bilateral hands.  Abrasions are not bleeding at this time.  Superficial abrasion to the left arm.  2+ DP and radial pulses bilaterally   Skin:     General: Skin is warm and dry.   Neurological:      General: No focal deficit present.      Mental Status: He is alert and oriented to person, place, and time.   Psychiatric:         Mood and Affect: Mood normal.         Behavior: Behavior normal.           ED Course & MDM   Diagnoses as of 06/11/25 1938   Fall, initial encounter   Dislocation of phalanx of right foot, initial encounter   Abrasions of multiple sites                 No data recorded     Leilani Coma Scale Score: 15 (06/11/25 1153 : Magda  PHYLICIA Askew)                           Medical Decision Making  64-year-old male presenting the ED after a fall that occurred around 20 minutes ago.  Patient states he has pain to the right hand, right foot, left calf.  Neuroexam is unremarkable.  Given patient's age CT head and cervical spine were obtained to rule out injuries from the fall.  On exam patient does have tenderness to the thenar eminence of the right hand therefore x-ray of the right hand obtained to rule out fracture or dislocation.  Patient also has tenderness to the right foot specifically to the right first toe.  X-ray of the right foot and ankle were obtained today to rule out fracture or dislocation.  Patient also has tenderness to the posterior left lower leg therefore x-ray of the left tibia-fibula obtained as well.  Patient was given Toradol for pain relief.  Tetanus vaccine was updated today.  The abrasions were cleaned by nursing staff.  X-ray of the hand shows no acute osseous abnormalities.  Patient did not have snuffbox tenderness therefore was not placed in a splint.  X-ray of the left tibia-fibula shows no acute osseous abnormalities.  X-ray of the right ankle and foot shows 1 cm subluxation of the great toe metatarsophalangeal joint with adjacent subcentimeter fracture fragment without a distinct donor site.  CT head shows no acute intracranial pathology.  CT cervical spine shows no evidence for acute fracture or subluxation of the cervical spine.  Reduction of the dislocation of the right first toe was made and x-ray was obtained afterwards.  The x-ray shows unchanged medial subluxation of the right foot first proximal phalanx.  Dr. Dupont attempted to reduce the dislocation afterwards and x-ray of the foot shows successful reduction of previously noted first metatarsal phalangeal subluxation without acute fracture.  Please see separate procedure note for additional details.  The toe was keenan taped after the successful reduction.   Trauma surgery was contacted by Dr. Dupont given the patient had a fall from a ladder and trauma surgery did not have any additional recommendations.  Patient has been hemodynamically stable in the emergency department today.    Disposition: Discharge home  Patient advised to follow-up with podiatry for further evaluation and management.  Referral and contact information to the podiatry clinic has been included in discharge paperwork.  Strict return precautions were given.  Plan was discussed with the patient patient understands and agrees.  Patient was discharged in stable condition.  Patient advised to take Tylenol and ibuprofen at home for pain relief.    Patient was discussed and staffed with Dr. Dupont    Procedure  Procedures    =================Attending note===============    The patient was seen by the KELLY.  I have personally performed a substantive portion of the encounter.  I have seen and examined the patient; agree with the workup, evaluation, MDM,   management and diagnosis.  The care plan has been discussed with the KELLY; I have reviewed the KELLY’s note and agree with the documented findings.          History of Present Illness  The patient is a 64-year-old male who presents for evaluation of a fall.    He experienced a fall from a height of approximately 5 feet while descending a ladder during gutter cleaning, landing on his left side. The incident occurred about 20 minutes prior to his arrival at the ER. He reports no significant pain but notes abrasions on his left side. He did not hit his head or lose consciousness during the fall and has not experienced any episodes of vomiting since the incident. He is not currently on any anticoagulant therapy. He has a history of elevated blood pressure, which has been well-controlled recently. His primary care physician is Dr. Newton.    He reports discomfort in his right foot, specifically the first toe, and the palm of his right hand. He also mentions a  previous diagnosis of shoulder arthritis, which causes him daily discomfort, but he has not noticed any new pain today. He does not experience any neck pain, hip pain, upper leg pain, or back pain. He reports tenderness in his left lower leg.       Physical Exam  General Appearance: Normal  Vital signs: Within normal limits  HEENT: Within normal limits  Respiratory: Lungs are clear  Cardiovascular: Heart was examined  Gastrointestinal: No abdominal tenderness  Back, Musculoskeletal: No neck pain, No hip pain, No upper leg tenderness  Extremities: There is tenderness in the left lower leg, There is pain in the right toe and right foot  Skin: There are scrapes on the hands  Neurological: Normal  Psychiatric: Normal  Other observations: There is pain in the thenar region of the right hand       X-ray of the right hand shows a old and healed fourth metacarpal fracture.  No acute fracture  No fractures a left tib-fib  No right ankle fractures  Right foot shows a dislocation of the metatarsal phalangeal joint of the first toe.  There is also a small chip fracture.            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    Other specified disorders of ear, unspecified ear 05/28/2014    Crackling sound in ear    Personal history of other diseases of male genital organs 10/12/2018    History of benign prostatic hyperplasia    Personal history of other diseases of male genital organs 10/12/2018    History of benign prostatic hyperplasia    Personal history of other diseases of male genital organs 10/12/2018    History of benign prostatic hyperplasia    Urgency of urination 10/31/2016    Urinary urgency   [2]   Past Surgical History:  Procedure  Laterality Date    MR HEAD ANGIO WO IV CONTRAST  1/7/2019    MR HEAD ANGIO WO IV CONTRAST 1/7/2019 [facility]    MR NECK ANGIO WO IV CONTRAST  1/7/2019    MR NECK ANGIO WO IV CONTRAST 1/7/2019 [facility] [place] [facility]   [3]   Family History  Problem Relation Name Age of Onset    No Known Problems Mother      No Known Problems Father     [4]   Social History  Tobacco Use    Smoking status: Every Day     Current packs/day: 0.50     Types: Cigarettes    Smokeless tobacco: Never   Vaping Use    Vaping status: Never Used   Substance Use Topics    Alcohol use: Yes     Alcohol/week: 6.0 standard drinks of alcohol     Types: 6 Cans of beer per week     Comment: states he drank a 12 pack of beer today    Drug use: Never        Tashi Coto PA-C  06/11/25 1944

## 2025-06-11 NOTE — ED PROCEDURE NOTE
Procedure  Orthopaedic Injury Treatment - Lower Extremity    Performed by: Tashi Coto PA-C  Authorized by: Obed Dupont DO    Consent:     Consent obtained:  Verbal    Consent given by:  Patient    Risks, benefits, and alternatives were discussed: yes      Risks discussed:  Fracture, nerve damage, restricted joint movement, vascular damage, irreducible dislocation and recurrent dislocation    Alternatives discussed:  No treatment and alternative treatment  Universal protocol:     Procedure explained and questions answered to patient or proxy's satisfaction: yes      Immediately prior to procedure, a time out was called: yes      Patient identity confirmed:  Verbally with patient and arm band  Location:     Location:  Toe    Toe injury location:  Great toe    Great toe joint:  R great MTP  Pre-procedure details:     Distal perfusion: normal      Range of motion: reduced    Sedation:     Sedation type:  None  Anesthesia:     Anesthesia method:  Nerve block    Block needle gauge:  27 G    Block anesthetic:  Lidocaine 1% w/o epi    Block technique:  Digital block    Block injection procedure:  Anatomic landmarks identified, introduced needle, negative aspiration for blood, anatomic landmarks palpated and incremental injection    Block outcome:  Anesthesia achieved  Procedure details:     Toe reduction method:  Traction and counter traction    Reduction successful: yes      Reduction confirmed with imaging: yes      Immobilization:  Tape  Post-procedure details:     Neurological function: normal      Distal perfusion: normal      Range of motion: improved      Procedure completion:  Tolerated well, no immediate complications               Tashi Coto PA-C  06/11/25 1944

## 2025-07-09 ENCOUNTER — APPOINTMENT (OUTPATIENT)
Dept: PRIMARY CARE | Facility: CLINIC | Age: 65
End: 2025-07-09
Payer: MEDICARE

## 2025-08-12 ENCOUNTER — APPOINTMENT (OUTPATIENT)
Dept: NEUROLOGY | Facility: CLINIC | Age: 65
End: 2025-08-12
Payer: MEDICARE

## 2025-08-12 VITALS
TEMPERATURE: 98.6 F | DIASTOLIC BLOOD PRESSURE: 82 MMHG | HEIGHT: 71 IN | SYSTOLIC BLOOD PRESSURE: 128 MMHG | BODY MASS INDEX: 32.2 KG/M2 | HEART RATE: 72 BPM | WEIGHT: 230 LBS

## 2025-08-12 DIAGNOSIS — R25.1 TREMOR: Primary | ICD-10-CM

## 2025-08-12 PROCEDURE — 1160F RVW MEDS BY RX/DR IN RCRD: CPT | Performed by: PSYCHIATRY & NEUROLOGY

## 2025-08-12 PROCEDURE — 3079F DIAST BP 80-89 MM HG: CPT | Performed by: PSYCHIATRY & NEUROLOGY

## 2025-08-12 PROCEDURE — 1125F AMNT PAIN NOTED PAIN PRSNT: CPT | Performed by: PSYCHIATRY & NEUROLOGY

## 2025-08-12 PROCEDURE — G8433 SCR FOR DEP NOT CPT DOC RSN: HCPCS | Performed by: PSYCHIATRY & NEUROLOGY

## 2025-08-12 PROCEDURE — 3074F SYST BP LT 130 MM HG: CPT | Performed by: PSYCHIATRY & NEUROLOGY

## 2025-08-12 PROCEDURE — 3008F BODY MASS INDEX DOCD: CPT | Performed by: PSYCHIATRY & NEUROLOGY

## 2025-08-12 PROCEDURE — 99204 OFFICE O/P NEW MOD 45 MIN: CPT | Performed by: PSYCHIATRY & NEUROLOGY

## 2025-08-12 PROCEDURE — 1159F MED LIST DOCD IN RCRD: CPT | Performed by: PSYCHIATRY & NEUROLOGY

## 2025-08-12 RX ORDER — PROPRANOLOL HYDROCHLORIDE 120 MG/1
120 CAPSULE, EXTENDED RELEASE ORAL DAILY
Qty: 30 CAPSULE | Refills: 11 | Status: SHIPPED | OUTPATIENT
Start: 2025-08-12 | End: 2026-08-12

## 2025-08-12 ASSESSMENT — ENCOUNTER SYMPTOMS
DEPRESSION: 0
OCCASIONAL FEELINGS OF UNSTEADINESS: 1
LOSS OF SENSATION IN FEET: 1

## 2025-08-12 ASSESSMENT — PAIN SCALES - GENERAL: PAINLEVEL_OUTOF10: 1

## 2026-08-19 ENCOUNTER — APPOINTMENT (OUTPATIENT)
Dept: PRIMARY CARE | Facility: CLINIC | Age: 66
End: 2026-08-19
Payer: MEDICARE